# Patient Record
Sex: MALE | Race: WHITE | NOT HISPANIC OR LATINO | Employment: UNEMPLOYED | URBAN - METROPOLITAN AREA
[De-identification: names, ages, dates, MRNs, and addresses within clinical notes are randomized per-mention and may not be internally consistent; named-entity substitution may affect disease eponyms.]

---

## 2020-09-17 ENCOUNTER — RECORDS - HEALTHEAST (OUTPATIENT)
Dept: LAB | Facility: CLINIC | Age: 40
End: 2020-09-17

## 2020-09-17 LAB
ANION GAP SERPL CALCULATED.3IONS-SCNC: 16 MMOL/L (ref 5–18)
BUN SERPL-MCNC: 13 MG/DL (ref 8–22)
CALCIUM SERPL-MCNC: 9.7 MG/DL (ref 8.5–10.5)
CHLORIDE BLD-SCNC: 99 MMOL/L (ref 98–107)
CO2 SERPL-SCNC: 26 MMOL/L (ref 22–31)
CREAT SERPL-MCNC: 0.98 MG/DL (ref 0.7–1.3)
GFR SERPL CREATININE-BSD FRML MDRD: >60 ML/MIN/1.73M2
GLUCOSE BLD-MCNC: 85 MG/DL (ref 70–125)
POTASSIUM BLD-SCNC: 4.2 MMOL/L (ref 3.5–5)
SODIUM SERPL-SCNC: 141 MMOL/L (ref 136–145)
TSH SERPL DL<=0.005 MIU/L-ACNC: 1.26 UIU/ML (ref 0.3–5)

## 2021-02-06 ENCOUNTER — HOSPITAL ENCOUNTER (EMERGENCY)
Facility: CLINIC | Age: 41
Discharge: HOME | End: 2021-02-06
Attending: EMERGENCY MEDICINE | Admitting: EMERGENCY MEDICINE
Payer: COMMERCIAL

## 2021-02-06 VITALS
DIASTOLIC BLOOD PRESSURE: 87 MMHG | TEMPERATURE: 96.6 F | HEART RATE: 115 BPM | WEIGHT: 175 LBS | RESPIRATION RATE: 12 BRPM | SYSTOLIC BLOOD PRESSURE: 117 MMHG | OXYGEN SATURATION: 97 %

## 2021-02-06 DIAGNOSIS — F10.930 ALCOHOL WITHDRAWAL SYNDROME WITHOUT COMPLICATION (H): ICD-10-CM

## 2021-02-06 LAB
ALBUMIN SERPL-MCNC: 4.6 G/DL (ref 3.4–5)
ALCOHOL BREATH TEST: 0.19 (ref 0–0.01)
ALCOHOL BREATH TEST: 0.36 (ref 0–0.01)
ALP SERPL-CCNC: 69 U/L (ref 40–150)
ALT SERPL W P-5'-P-CCNC: 60 U/L (ref 0–70)
AMPHETAMINES UR QL SCN: NEGATIVE
ANION GAP SERPL CALCULATED.3IONS-SCNC: 9 MMOL/L (ref 3–14)
AST SERPL W P-5'-P-CCNC: 33 U/L (ref 0–45)
BARBITURATES UR QL: NEGATIVE
BASOPHILS # BLD AUTO: 0.1 10E9/L (ref 0–0.2)
BASOPHILS NFR BLD AUTO: 1 %
BENZODIAZ UR QL: NEGATIVE
BILIRUB SERPL-MCNC: 0.7 MG/DL (ref 0.2–1.3)
BUN SERPL-MCNC: 13 MG/DL (ref 7–30)
CALCIUM SERPL-MCNC: 8.6 MG/DL (ref 8.5–10.1)
CANNABINOIDS UR QL SCN: NEGATIVE
CHLORIDE SERPL-SCNC: 108 MMOL/L (ref 94–109)
CO2 SERPL-SCNC: 29 MMOL/L (ref 20–32)
COCAINE UR QL: NEGATIVE
CREAT SERPL-MCNC: 1.03 MG/DL (ref 0.66–1.25)
DIFFERENTIAL METHOD BLD: ABNORMAL
EOSINOPHIL # BLD AUTO: 0.2 10E9/L (ref 0–0.7)
EOSINOPHIL NFR BLD AUTO: 1.9 %
ERYTHROCYTE [DISTWIDTH] IN BLOOD BY AUTOMATED COUNT: 11.9 % (ref 10–15)
ETHANOL UR QL SCN: POSITIVE
GFR SERPL CREATININE-BSD FRML MDRD: >90 ML/MIN/{1.73_M2}
GLUCOSE SERPL-MCNC: 93 MG/DL (ref 70–99)
HCT VFR BLD AUTO: 51.1 % (ref 40–53)
HGB BLD-MCNC: 17.8 G/DL (ref 13.3–17.7)
IMM GRANULOCYTES # BLD: 0 10E9/L (ref 0–0.4)
IMM GRANULOCYTES NFR BLD: 0.3 %
LABORATORY COMMENT REPORT: NORMAL
LYMPHOCYTES # BLD AUTO: 4.9 10E9/L (ref 0.8–5.3)
LYMPHOCYTES NFR BLD AUTO: 42.4 %
MCH RBC QN AUTO: 31.9 PG (ref 26.5–33)
MCHC RBC AUTO-ENTMCNC: 34.8 G/DL (ref 31.5–36.5)
MCV RBC AUTO: 92 FL (ref 78–100)
MONOCYTES # BLD AUTO: 0.4 10E9/L (ref 0–1.3)
MONOCYTES NFR BLD AUTO: 3.8 %
NEUTROPHILS # BLD AUTO: 5.8 10E9/L (ref 1.6–8.3)
NEUTROPHILS NFR BLD AUTO: 50.6 %
NRBC # BLD AUTO: 0 10*3/UL
NRBC BLD AUTO-RTO: 0 /100
OPIATES UR QL SCN: NEGATIVE
PLATELET # BLD AUTO: 288 10E9/L (ref 150–450)
POTASSIUM SERPL-SCNC: 3.6 MMOL/L (ref 3.4–5.3)
PROT SERPL-MCNC: 7.8 G/DL (ref 6.8–8.8)
RBC # BLD AUTO: 5.58 10E12/L (ref 4.4–5.9)
SARS-COV-2 RNA RESP QL NAA+PROBE: NEGATIVE
SODIUM SERPL-SCNC: 146 MMOL/L (ref 133–144)
SPECIMEN SOURCE: NORMAL
WBC # BLD AUTO: 11.5 10E9/L (ref 4–11)

## 2021-02-06 PROCEDURE — 82075 ASSAY OF BREATH ETHANOL: CPT | Performed by: EMERGENCY MEDICINE

## 2021-02-06 PROCEDURE — 87635 SARS-COV-2 COVID-19 AMP PRB: CPT | Performed by: EMERGENCY MEDICINE

## 2021-02-06 PROCEDURE — 80320 DRUG SCREEN QUANTALCOHOLS: CPT | Performed by: EMERGENCY MEDICINE

## 2021-02-06 PROCEDURE — 250N000013 HC RX MED GY IP 250 OP 250 PS 637: Performed by: EMERGENCY MEDICINE

## 2021-02-06 PROCEDURE — 85025 COMPLETE CBC W/AUTO DIFF WBC: CPT | Performed by: EMERGENCY MEDICINE

## 2021-02-06 PROCEDURE — 99283 EMERGENCY DEPT VISIT LOW MDM: CPT | Performed by: EMERGENCY MEDICINE

## 2021-02-06 PROCEDURE — C9803 HOPD COVID-19 SPEC COLLECT: HCPCS | Performed by: EMERGENCY MEDICINE

## 2021-02-06 PROCEDURE — 80307 DRUG TEST PRSMV CHEM ANLYZR: CPT | Performed by: EMERGENCY MEDICINE

## 2021-02-06 PROCEDURE — 80053 COMPREHEN METABOLIC PANEL: CPT | Performed by: EMERGENCY MEDICINE

## 2021-02-06 RX ORDER — CITALOPRAM HYDROBROMIDE 10 MG/1
10 TABLET ORAL DAILY
COMMUNITY
End: 2021-02-06

## 2021-02-06 RX ORDER — ESCITALOPRAM OXALATE 10 MG/1
10 TABLET ORAL DAILY
COMMUNITY
End: 2021-12-08

## 2021-02-06 RX ORDER — METHOCARBAMOL 500 MG/1
500 TABLET, FILM COATED ORAL 4 TIMES DAILY PRN
COMMUNITY
End: 2024-07-22

## 2021-02-06 RX ORDER — LORAZEPAM 1 MG/1
1-4 TABLET ORAL EVERY 30 MIN PRN
Status: DISCONTINUED | OUTPATIENT
Start: 2021-02-06 | End: 2021-02-06 | Stop reason: HOSPADM

## 2021-02-06 RX ADMIN — NICOTINE POLACRILEX 4 MG: 4 GUM, CHEWING BUCCAL at 21:14

## 2021-02-06 RX ADMIN — NICOTINE POLACRILEX 4 MG: 4 GUM, CHEWING BUCCAL at 19:38

## 2021-02-06 RX ADMIN — NICOTINE POLACRILEX 2 MG: 2 GUM, CHEWING BUCCAL at 18:19

## 2021-02-06 RX ADMIN — NICOTINE POLACRILEX 4 MG: 4 GUM, CHEWING BUCCAL at 17:28

## 2021-02-06 RX ADMIN — LORAZEPAM 2 MG: 1 TABLET ORAL at 18:10

## 2021-02-06 ASSESSMENT — ENCOUNTER SYMPTOMS
FEVER: 0
COUGH: 0
CHILLS: 0
SHORTNESS OF BREATH: 0
NAUSEA: 0
VOMITING: 0

## 2021-02-06 NOTE — ED PROVIDER NOTES
ED Provider Note  [facility]      History     Chief Complaint   Patient presents with     Drug / Alcohol Assessment     The history is provided by the patient and medical records.   Drug / Alcohol Assessment  Pertinent negatives include no shortness of breath.     Cristobal Jorge is a 40 year old male with a history of depression, anxiety, and chronic alcohol abuse who  presents to the ED for evaluation of alcohol intoxication seeking detox. His last drink was prior to arrival.  He reports chronic alcohol use, drinking at least a liter per day for many months.  He denies nausea, vomiting, fevers, chills, cough, or shortness of breath.  Patient denies history of withdrawal seizures or DTs.  He denies SI or HI. Denies other substance use.    No past medical history on file.    No past surgical history on file.    No family history on file.    Social History     Tobacco Use     Smoking status: Not on file   Substance Use Topics     Alcohol use: Not on file       Current Facility-Administered Medications   Medication     LORazepam (ATIVAN) tablet 1-4 mg     No current outpatient medications on file.        Allergies   Allergen Reactions     Amoxicillin Rash and Hives        Review of Systems   Constitutional: Negative for chills and fever.   Respiratory: Negative for cough and shortness of breath.    Gastrointestinal: Negative for nausea and vomiting.   All other systems reviewed and are negative.    A complete review of systems was performed with pertinent positives and negatives noted in the HPI, and all other systems negative.    Physical Exam   BP: 138/78  Pulse: 79  Temp: 97.9  F (36.6  C)  SpO2: 97 %  Physical Exam  Vitals signs and nursing note reviewed.   Constitutional:       General: He is not in acute distress.     Appearance: He is not diaphoretic.   HENT:      Head: Atraumatic.   Eyes:      General: No scleral icterus.     Pupils: Pupils are equal, round, and reactive to  light.   Cardiovascular:      Heart sounds: Normal heart sounds.   Pulmonary:      Effort: No respiratory distress.      Breath sounds: Normal breath sounds.   Abdominal:      General: Bowel sounds are normal.      Palpations: Abdomen is soft.      Tenderness: There is no abdominal tenderness.   Musculoskeletal:         General: No tenderness.   Skin:     General: Skin is warm.      Findings: No rash.           ED Course      Procedures                            Results for orders placed or performed during the hospital encounter of 02/06/21   Alcohol breath test POCT     Status: Abnormal   Result Value Ref Range    Alcohol Breath Test 0.358 (A) 0.00 - 0.01     Medications   LORazepam (ATIVAN) tablet 1-4 mg (has no administration in time range)        Assessments & Plan (with Medical Decision Making)     40 year old male with a history of depression, anxiety, and chronic alcohol abuse who  presents to the ED for evaluation of alcohol intoxication seeking detox.  Patient placed on MSSA protocol and managed accordingly.  He requested and was given nicotine gum 4 mg several times during the course of the ED stay.  Screening labs were drawn sent reviewed and document in epic and remarkable for hemoglobin of 17.8 and a leukocytosis with a white count 11.5, both abnormalities likely representative of hemoconcentration.  Alcohol level 0.190, sodium slightly elevated 146, UDS positive for ethanol.  COVID-19 swab negative.  Case was discussed with behavioral intake in room on detox floor was a research.  However approximately 20 minutes prior to transfer to detox floor patient recanted his intent to follow through with detox and requested discharge home.    I have reviewed the nursing notes. I have reviewed the findings, diagnosis, plan and need for follow up with the patient.    New Prescriptions    No medications on file       Final diagnoses:   Alcohol withdrawal syndrome without complication (H)     Ana Luisa FRANCIS  Shaka, am serving as a trained medical scribe to document services personally performed by Chelsea Ritchie MD, based on the provider's statements to me.      I, Chelsea Ritchie MD, was physically present and have reviewed and verified the accuracy of this note documented by Ana Luisa Matt.     --  Chelsea Ritchie MD  [facility] EMERGENCY DEPARTMENT  2/6/2021     Chelsea Ritchie MD  02/08/21 1828

## 2021-02-07 NOTE — PHARMACY-ADMISSION MEDICATION HISTORY
Admission Medication History Completed by Pharmacy    See [facility] Admission Navigator for allergy information, preferred outpatient pharmacy, prior to admission medications and immunization status.     Medication History Sources:     Patient    Sure Scripts    Changes made to PTA medication list (reason):    Added:   o Escitalopram 10mg tablet    Deleted:   o Citalopram 10mg tablet: Take one tablet by mouth daily (per patient and Sure Scripts)    Changed: None    Additional Information:    Patient was a good historian of his medications and was able to confirm information found on Sure Scripts.    Prior to Admission medications    Medication Sig Last Dose Taking? Auth Provider   escitalopram (LEXAPRO) 10 MG tablet Take 10 mg by mouth daily 2/6/2021 Yes Unknown, Entered By History   methocarbamol (ROBAXIN) 500 MG tablet Take 500 mg by mouth 4 times daily as needed for muscle spasms Past Week Yes Reported, Patient       Date completed: 02/06/21    Medication history completed by: Adeline Baxter

## 2021-02-07 NOTE — ED NOTES
"Writer was called to patient room as patient was frustrated with the process. Patients mother was on the phone yelling at writer about \"how I do not care for people who need help\". Writer explained that the process is bigger than my control. The patient was explained that he could find a sober ride and go home or stay and wait out the process. Patient asked to call him wife and to have writer come back. After returning patient was calmer and stated that he was going to stay. He asked if he could gt his vape and go for a walk, writer replied no and offered a piece of gum.  "

## 2021-02-07 NOTE — PROGRESS NOTES
ED to Behavioral Floor Handoff    SITUATION  Cristobal Jorge is a 40 year old male who speaks English and lives in a home alone The patient arrived in the ED by private car from emergency room with a complaint of Drug / Alcohol Assessment  .The patient's current symptoms started/worsened 1 week(s) ago and during this time the symptoms have increased.   In the ED, pt was diagnosed with   Final diagnoses:   Alcohol withdrawal syndrome without complication (H)        Initial vitals were: BP: 138/78  Pulse: 79  Temp: 97.9  F (36.6  C)  Resp: 12  Weight: 79.4 kg (175 lb)  SpO2: 97 %   --------  Is the patient diabetic? No   If yes, last blood glucose? --     If yes, was this treated in the ED? --  --------  Is the patient inebriated (ETOH) Yes or Impaired on other substances? Yes  MSSA done? Yes  Last MSSA score:13 Were withdrawal symptoms treated? Yes  Does the patient have a seizure history? No. If yes, date of most recent seizure--  --------  Is the patient patient experiencing suicidal ideation? denies current or recent suicidal ideation     Homicidal ideation? denies current or recent homicidal ideation or behaviors.    Self-injurious behavior/urges? denies current or recent self injurious behavior or ideation.  ------  Was pt aggressive in the ED No  Was a code called No  Is the pt now cooperative? Yes  -------  Meds given in ED:   Medications   LORazepam (ATIVAN) tablet 1-4 mg (2 mg Oral Given 2/6/21 1810)   nicotine polacrilex (NICORETTE) gum 4 mg (4 mg Buccal Given 2/6/21 1728)   nicotine (NICORETTE) gum 2 mg (2 mg Buccal Given 2/6/21 1819)      Family present during ED course? No  Family currently present? No    BACKGROUND  Does the patient have a cognitive impairment or developmental disability? No  Allergies:   Allergies   Allergen Reactions     Amoxicillin Rash and Hives   .   Social demographics are   Social History     Socioeconomic History     Marital status:      Spouse name: Not on file      Number of children: Not on file     Years of education: Not on file     Highest education level: Not on file   Occupational History     Not on file   Social Needs     Financial resource strain: Not on file     Food insecurity     Worry: Not on file     Inability: Not on file     Transportation needs     Medical: Not on file     Non-medical: Not on file   Tobacco Use     Smoking status: Not on file   Substance and Sexual Activity     Alcohol use: Not on file     Drug use: Not on file     Sexual activity: Not on file   Lifestyle     Physical activity     Days per week: Not on file     Minutes per session: Not on file     Stress: Not on file   Relationships     Social connections     Talks on phone: Not on file     Gets together: Not on file     Attends Synagogue service: Not on file     Active member of club or organization: Not on file     Attends meetings of clubs or organizations: Not on file     Relationship status: Not on file     Intimate partner violence     Fear of current or ex partner: Not on file     Emotionally abused: Not on file     Physically abused: Not on file     Forced sexual activity: Not on file   Other Topics Concern     Not on file   Social History Narrative     Not on file        ASSESSMENT  Labs results   Labs Ordered and Resulted from Time of ED Arrival Up to the Time of Departure from the ED   CBC WITH PLATELETS DIFFERENTIAL - Abnormal; Notable for the following components:       Result Value    WBC 11.5 (*)     Hemoglobin 17.8 (*)     All other components within normal limits   COMPREHENSIVE METABOLIC PANEL - Abnormal; Notable for the following components:    Sodium 146 (*)     All other components within normal limits   ALCOHOL BREATH TEST POCT - Abnormal; Notable for the following components:    Alcohol Breath Test 0.358 (*)     All other components within normal limits   SARS-COV-2 (COVID-19) VIRUS RT-PCR   DRUG ABUSE SCREEN 6 CHEM DEP URINE ([facility])   MSSA SCORE AND VS   NOTIFY       Imaging Studies: No results found for this or any previous visit (from the past 24 hour(s)).   Most recent vital signs /78   Pulse 86   Temp 97.9  F (36.6  C) (Tympanic)   Resp 12   Wt 79.4 kg (175 lb)   SpO2 97%    Abnormal labs/tests/findings requiring intervention:---   Pain control: good  Nausea control: good    RECOMMENDATION  Are any infection precautions needed (MRSA, VRE, etc.)? No If yes, what infection? --  ---  Does the patient have mobility issues? independently. If yes, what device does the pt use? ---  ---  Is patient on 72 hour hold or commitment? No If on 72 hour hold, have hold and rights been given to patient? No  Are admitting orders written if after 10 p.m. ?No  Tasks needing to be completed:---     Ayden Kingsley, RN     [phone] [facility]

## 2021-02-07 NOTE — ED NOTES
Writer was called to the room after noting that patient had a bed on 3a and called report. Writer was told that patient could go up in 20mins. Writer went to speak with patient and patient was adement to go home. MD Ritchie spoke with patient and again, patient was adement that he wanted to go home. Wife was coming to  patient. Discharged was prepared for the patient

## 2021-04-03 ENCOUNTER — HEALTH MAINTENANCE LETTER (OUTPATIENT)
Age: 41
End: 2021-04-03

## 2021-09-18 ENCOUNTER — HEALTH MAINTENANCE LETTER (OUTPATIENT)
Age: 41
End: 2021-09-18

## 2021-12-08 ENCOUNTER — HOSPITAL ENCOUNTER (INPATIENT)
Facility: CLINIC | Age: 41
LOS: 1 days | Discharge: HOME | DRG: 897 | End: 2021-12-10
Attending: EMERGENCY MEDICINE | Admitting: PSYCHIATRY & NEUROLOGY
Payer: COMMERCIAL

## 2021-12-08 ENCOUNTER — TELEPHONE (OUTPATIENT)
Dept: BEHAVIORAL HEALTH | Facility: CLINIC | Age: 41
End: 2021-12-08

## 2021-12-08 DIAGNOSIS — F10.20 ALCOHOL USE DISORDER, MODERATE, DEPENDENCE (H): Primary | ICD-10-CM

## 2021-12-08 DIAGNOSIS — Z20.822 CONTACT WITH AND (SUSPECTED) EXPOSURE TO COVID-19: ICD-10-CM

## 2021-12-08 LAB
ALBUMIN SERPL-MCNC: 5 G/DL (ref 3.4–5)
ALCOHOL BREATH TEST: 0.31 (ref 0–0.01)
ALP SERPL-CCNC: 65 U/L (ref 40–150)
ALT SERPL W P-5'-P-CCNC: 40 U/L (ref 0–70)
AMPHETAMINES UR QL SCN: NORMAL
ANION GAP SERPL CALCULATED.3IONS-SCNC: 12 MMOL/L (ref 3–14)
AST SERPL W P-5'-P-CCNC: 32 U/L (ref 0–45)
BARBITURATES UR QL: NORMAL
BASOPHILS # BLD AUTO: 0.1 10E3/UL (ref 0–0.2)
BASOPHILS NFR BLD AUTO: 1 %
BENZODIAZ UR QL: NORMAL
BILIRUB SERPL-MCNC: 0.9 MG/DL (ref 0.2–1.3)
BUN SERPL-MCNC: 21 MG/DL (ref 7–30)
CALCIUM SERPL-MCNC: 9.5 MG/DL (ref 8.5–10.1)
CANNABINOIDS UR QL SCN: NORMAL
CHLORIDE BLD-SCNC: 102 MMOL/L (ref 94–109)
CO2 SERPL-SCNC: 26 MMOL/L (ref 20–32)
COCAINE UR QL: NORMAL
CREAT SERPL-MCNC: 0.89 MG/DL (ref 0.66–1.25)
EOSINOPHIL # BLD AUTO: 0.2 10E3/UL (ref 0–0.7)
EOSINOPHIL NFR BLD AUTO: 1 %
ERYTHROCYTE [DISTWIDTH] IN BLOOD BY AUTOMATED COUNT: 11.3 % (ref 10–15)
GFR SERPL CREATININE-BSD FRML MDRD: >90 ML/MIN/1.73M2
GLUCOSE BLD-MCNC: 97 MG/DL (ref 70–99)
HCT VFR BLD AUTO: 50.7 % (ref 40–53)
HGB BLD-MCNC: 18.6 G/DL (ref 13.3–17.7)
HOLD SPECIMEN: NORMAL
IMM GRANULOCYTES # BLD: 0.1 10E3/UL
IMM GRANULOCYTES NFR BLD: 0 %
LIPASE SERPL-CCNC: 135 U/L (ref 73–393)
LYMPHOCYTES # BLD AUTO: 5.2 10E3/UL (ref 0.8–5.3)
LYMPHOCYTES NFR BLD AUTO: 34 %
MCH RBC QN AUTO: 30.8 PG (ref 26.5–33)
MCHC RBC AUTO-ENTMCNC: 36.7 G/DL (ref 31.5–36.5)
MCV RBC AUTO: 84 FL (ref 78–100)
MONOCYTES # BLD AUTO: 0.7 10E3/UL (ref 0–1.3)
MONOCYTES NFR BLD AUTO: 4 %
NEUTROPHILS # BLD AUTO: 9 10E3/UL (ref 1.6–8.3)
NEUTROPHILS NFR BLD AUTO: 60 %
NRBC # BLD AUTO: 0 10E3/UL
NRBC BLD AUTO-RTO: 0 /100
OPIATES UR QL SCN: NORMAL
PLAT MORPH BLD: NORMAL
PLATELET # BLD AUTO: 304 10E3/UL (ref 150–450)
POTASSIUM BLD-SCNC: 3.9 MMOL/L (ref 3.4–5.3)
PROT SERPL-MCNC: 8.6 G/DL (ref 6.8–8.8)
RBC # BLD AUTO: 6.04 10E6/UL (ref 4.4–5.9)
RBC MORPH BLD: NORMAL
SARS-COV-2 RNA RESP QL NAA+PROBE: NEGATIVE
SODIUM SERPL-SCNC: 140 MMOL/L (ref 133–144)
WBC # BLD AUTO: 15.2 10E3/UL (ref 4–11)

## 2021-12-08 PROCEDURE — 82040 ASSAY OF SERUM ALBUMIN: CPT | Performed by: EMERGENCY MEDICINE

## 2021-12-08 PROCEDURE — 36415 COLL VENOUS BLD VENIPUNCTURE: CPT | Performed by: EMERGENCY MEDICINE

## 2021-12-08 PROCEDURE — C9803 HOPD COVID-19 SPEC COLLECT: HCPCS | Performed by: EMERGENCY MEDICINE

## 2021-12-08 PROCEDURE — 250N000013 HC RX MED GY IP 250 OP 250 PS 637: Performed by: EMERGENCY MEDICINE

## 2021-12-08 PROCEDURE — 82075 ASSAY OF BREATH ETHANOL: CPT | Performed by: EMERGENCY MEDICINE

## 2021-12-08 PROCEDURE — 83690 ASSAY OF LIPASE: CPT | Performed by: EMERGENCY MEDICINE

## 2021-12-08 PROCEDURE — 250N000011 HC RX IP 250 OP 636: Performed by: EMERGENCY MEDICINE

## 2021-12-08 PROCEDURE — 99284 EMERGENCY DEPT VISIT MOD MDM: CPT | Performed by: EMERGENCY MEDICINE

## 2021-12-08 PROCEDURE — U0003 INFECTIOUS AGENT DETECTION BY NUCLEIC ACID (DNA OR RNA); SEVERE ACUTE RESPIRATORY SYNDROME CORONAVIRUS 2 (SARS-COV-2) (CORONAVIRUS DISEASE [COVID-19]), AMPLIFIED PROBE TECHNIQUE, MAKING USE OF HIGH THROUGHPUT TECHNOLOGIES AS DESCRIBED BY CMS-2020-01-R: HCPCS | Performed by: EMERGENCY MEDICINE

## 2021-12-08 PROCEDURE — 85025 COMPLETE CBC W/AUTO DIFF WBC: CPT | Performed by: EMERGENCY MEDICINE

## 2021-12-08 PROCEDURE — 80307 DRUG TEST PRSMV CHEM ANLYZR: CPT | Performed by: EMERGENCY MEDICINE

## 2021-12-08 PROCEDURE — 99285 EMERGENCY DEPT VISIT HI MDM: CPT | Performed by: EMERGENCY MEDICINE

## 2021-12-08 RX ORDER — DIAZEPAM 5 MG
5-20 TABLET ORAL EVERY 30 MIN PRN
Status: DISCONTINUED | OUTPATIENT
Start: 2021-12-08 | End: 2021-12-09

## 2021-12-08 RX ORDER — MULTIPLE VITAMINS W/ MINERALS TAB 9MG-400MCG
1 TAB ORAL DAILY
COMMUNITY

## 2021-12-08 RX ORDER — ONDANSETRON 4 MG/1
4 TABLET, ORALLY DISINTEGRATING ORAL ONCE
Status: COMPLETED | OUTPATIENT
Start: 2021-12-08 | End: 2021-12-08

## 2021-12-08 RX ORDER — FOLIC ACID 1 MG/1
1 TABLET ORAL DAILY
Status: DISCONTINUED | OUTPATIENT
Start: 2021-12-08 | End: 2021-12-09

## 2021-12-08 RX ORDER — MULTIPLE VITAMINS W/ MINERALS TAB 9MG-400MCG
1 TAB ORAL DAILY
Status: DISCONTINUED | OUTPATIENT
Start: 2021-12-08 | End: 2021-12-09

## 2021-12-08 RX ORDER — NICOTINE 21 MG/24HR
1 PATCH, TRANSDERMAL 24 HOURS TRANSDERMAL ONCE
Status: DISCONTINUED | OUTPATIENT
Start: 2021-12-08 | End: 2021-12-09

## 2021-12-08 RX ADMIN — THIAMINE HCL TAB 100 MG 100 MG: 100 TAB at 20:20

## 2021-12-08 RX ADMIN — ONDANSETRON 4 MG: 4 TABLET, ORALLY DISINTEGRATING ORAL at 19:43

## 2021-12-08 RX ADMIN — NICOTINE 1 PATCH: 21 PATCH, EXTENDED RELEASE TRANSDERMAL at 17:57

## 2021-12-08 RX ADMIN — DIAZEPAM 10 MG: 5 TABLET ORAL at 22:56

## 2021-12-08 RX ADMIN — FOLIC ACID 1 MG: 1 TABLET ORAL at 20:19

## 2021-12-08 RX ADMIN — DIAZEPAM 10 MG: 5 TABLET ORAL at 20:15

## 2021-12-08 RX ADMIN — DIAZEPAM 10 MG: 5 TABLET ORAL at 21:19

## 2021-12-08 RX ADMIN — DIAZEPAM 5 MG: 5 TABLET ORAL at 17:56

## 2021-12-08 RX ADMIN — MULTIPLE VITAMINS W/ MINERALS TAB 1 TABLET: TAB at 20:20

## 2021-12-08 NOTE — PHARMACY-ADMISSION MEDICATION HISTORY
Admission Medication History Completed by Pharmacy    See [facility] Admission Navigator for allergy information, preferred outpatient pharmacy, prior to admission medications and immunization status.     Medication History Sources:     Patient    Sure Scripts    Changes made to PTA medication list (reason):    Added: multivitamin (per pt)    Deleted: Lexapro 10mg daily (per pt not taking, last filled April 2021)    Changed: None    Additional Information:    Patient reports only taking a multivitamin each day. He says he still has some of the PRN methocarbamol that was last filled January 2021.    Prior to Admission medications    Medication Sig Last Dose Taking? Auth Provider   methocarbamol (ROBAXIN) 500 MG tablet Take 500 mg by mouth 4 times daily as needed for muscle spasms Past Month at Unknown time Yes Reported, Patient   multivitamin w/minerals (THERA-VIT-M) tablet Take 1 tablet by mouth daily 12/7/2021 at Unknown time Yes Unknown, Entered By History     Date completed: 12/08/21    Medication history completed by: Lora Slaughter, PharmD, BCPS

## 2021-12-08 NOTE — ED PROVIDER NOTES
[facility] EMERGENCY DEPARTMENT ([facility])    12/08/21     [facility] 2 2:41 PM   History     Chief Complaint   Patient presents with     Drug / Alcohol Assessment     HPI  Cristobal Jorge is a 41 year old male who presents seeking alcohol detox. He was brought here by a friend. He states that 2 months ago he went to [facility] for recovery. He started drinking again last Wednesday. He has been drinking 1 pint of vodka a day. He denies any episodes of nausea, vomiting or falls. 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 No history of depression or anxiety. He is prescribed Lexapro a year ago but was told he wouldn't need this, is not currently on this medication. He last took it 1 year ago. No history of alcohol withdrawal seizures or DTs. He can't tell if he is experiencing withdrawal yet or not, states he is shaky at baseline. No COVID-19 exposure. No COVID-19 symptoms, no cough or fever. No abdominal pain. He feels nauseated but no vomiting. No history of pancreatitis or alcoholic liver disease. He denies any other substance use. He denies any medical concerns. Per [facility] patient has had 3 doses of COVID-19 vaccination, last dose given on 11/18/21.     Past Medical History  Past Medical History:   Diagnosis Date     Substance abuse (H)      No past surgical history on file.  folic acid (FOLVITE) 1 MG tablet  methocarbamol (ROBAXIN) 500 MG tablet  multivitamin w/minerals (THERA-VIT-M) tablet  thiamine (B-1) 100 MG tablet      Allergies   Allergen Reactions     Amoxicillin Rash and Hives     Family History  No family history on file.  Social History   Social History     Tobacco Use     Smoking status: Not on file     Smokeless tobacco: Not on file   Substance Use Topics     Alcohol use: Not on file     Drug use: Not on file      Past medical history, past surgical history, medications, allergies, family history, and social history were reviewed with the  patient. No additional pertinent items.       Review of Systems  A complete review of systems was performed with pertinent positives and negatives noted in the HPI, and all other systems negative.    Physical Exam   BP: 135/86  Pulse: (!) 122  Temp: 97.7  F (36.5  C)  Resp: 16  Height: 182.9 cm (6')  Weight: 75 kg (165 lb 6.4 oz)  SpO2: 97 %     Physical Exam  Gen:A&Ox3, intoxicated but cooperative, tearful  HEENT: head atraumatic, PERRL  CV:RRR without murmurs  PULM:Clear to auscultation bilaterally  Abd:soft, nontender, nondistended. Negative rosales's sign. Bowel sounds present and normal  UE:No traumatic injuries, skin normal  LE:no traumatic injuries, skin normal, no LE   Neuro:CN II-XII intact, strength 5/5 throughout  Skin: no rashes or ecchymoses     ED Course      Procedures       Results for orders placed or performed during the hospital encounter of 12/08/21   Comprehensive metabolic panel     Status: Normal   Result Value Ref Range    Sodium 140 133 - 144 mmol/L    Potassium 3.9 3.4 - 5.3 mmol/L    Chloride 102 94 - 109 mmol/L    Carbon Dioxide (CO2) 26 20 - 32 mmol/L    Anion Gap 12 3 - 14 mmol/L    Urea Nitrogen 21 7 - 30 mg/dL    Creatinine 0.89 0.66 - 1.25 mg/dL    Calcium 9.5 8.5 - 10.1 mg/dL    Glucose 97 70 - 99 mg/dL    Alkaline Phosphatase 65 40 - 150 U/L    AST 32 0 - 45 U/L    ALT 40 0 - 70 U/L    Protein Total 8.6 6.8 - 8.8 g/dL    Albumin 5.0 3.4 - 5.0 g/dL    Bilirubin Total 0.9 0.2 - 1.3 mg/dL    GFR Estimate >90 >60 mL/min/1.73m2   Lipase     Status: Normal   Result Value Ref Range    Lipase 135 73 - 393 U/L   Asymptomatic COVID-19 Virus (Coronavirus) by PCR Nasopharyngeal     Status: Normal    Specimen: Nasopharyngeal; Swab   Result Value Ref Range    SARS CoV2 PCR Negative Negative, Testing sent to reference lab. Results will be returned via unsolicited result    Narrative    Testing was performed using the [employer] Xpress SARS-CoV-2 Assay on the  Cepheid Gene-Xpert Instrument Systems.  Additional information about  this Emergency Use Authorization (EUA) assay can be found via the Lab  Guide. This test should be ordered for the detection of SARS-CoV-2 in  individuals who meet SARS-CoV-2 clinical and/or epidemiological  criteria. Test performance is unknown in asymptomatic patients. This  test is for in vitro diagnostic use under the FDA EUA for  laboratories certified under CLIA to perform high complexity testing.  This test has not been FDA cleared or approved. A negative result  does not rule out the presence of PCR inhibitors in the specimen or  target RNA in concentration below the limit of detection for the  assay. The possibility of a false negative should be considered if  the patient's recent exposure or clinical presentation suggests  COVID-19. This test was validated by the [facility]  [facility]. This laboratory is certified under  the Clinical Laboratory Improvement Amendments of 1988 (CLIA-88) as  qualified to perform high complexity laboratory testing.     CBC with platelets and differential     Status: Abnormal   Result Value Ref Range    WBC Count 15.2 (H) 4.0 - 11.0 10e3/uL    RBC Count 6.04 (H) 4.40 - 5.90 10e6/uL    Hemoglobin 18.6 (H) 13.3 - 17.7 g/dL    Hematocrit 50.7 40.0 - 53.0 %    MCV 84 78 - 100 fL    MCH 30.8 26.5 - 33.0 pg    MCHC 36.7 (H) 31.5 - 36.5 g/dL    RDW 11.3 10.0 - 15.0 %    Platelet Count 304 150 - 450 10e3/uL    % Neutrophils 60 %    % Lymphocytes 34 %    % Monocytes 4 %    % Eosinophils 1 %    % Basophils 1 %    % Immature Granulocytes 0 %    NRBCs per 100 WBC 0 <1 /100    Absolute Neutrophils 9.0 (H) 1.6 - 8.3 10e3/uL    Absolute Lymphocytes 5.2 0.8 - 5.3 10e3/uL    Absolute Monocytes 0.7 0.0 - 1.3 10e3/uL    Absolute Eosinophils 0.2 0.0 - 0.7 10e3/uL    Absolute Basophils 0.1 0.0 - 0.2 10e3/uL    Absolute Immature Granulocytes 0.1 <=0.4 10e3/uL    Absolute NRBCs 0.0 10e3/uL   Drug abuse screen 1 urine (ED)     Status:  Normal   Result Value Ref Range    Amphetamines Urine Screen Negative Screen Negative    Barbiturates Urine Screen Negative Screen Negative    Benzodiazepines Urine Screen Negative Screen Negative    Cannabinoids Urine Screen Negative Screen Negative    Cocaine Urine Screen Negative Screen Negative    Opiates Urine Screen Negative Screen Negative   Extra Red Top Tube     Status: None   Result Value Ref Range    Hold Specimen JIC    RBC and Platelet Morphology     Status: None   Result Value Ref Range    Platelet Assessment  Automated Count Confirmed. Platelet morphology is normal.     Automated Count Confirmed. Platelet morphology is normal.    RBC Morphology Confirmed RBC Indices    GGT     Status: Normal   Result Value Ref Range    GGT 47 0 - 75 U/L   TSH with free T4 reflex and/or T3 as indicated     Status: Normal   Result Value Ref Range    TSH 1.66 0.40 - 4.00 mU/L   Lipid panel     Status: Abnormal   Result Value Ref Range    Cholesterol 210 (H) <200 mg/dL    Triglycerides 228 (H) <150 mg/dL    Direct Measure HDL 64 >=40 mg/dL    LDL Cholesterol Calculated 100 <=100 mg/dL    Non HDL Cholesterol 146 (H) <130 mg/dL    Narrative    Cholesterol  Desirable:  <200 mg/dL    Triglycerides  Normal:  Less than 150 mg/dL  Borderline High:  150-199 mg/dL  High:  200-499 mg/dL  Very High:  Greater than or equal to 500 mg/dL    Direct Measure HDL  Female:  Greater than or equal to 50 mg/dL   Male:  Greater than or equal to 40 mg/dL    LDL Cholesterol  Desirable:  <100mg/dL  Above Desirable:  100-129 mg/dL   Borderline High:  130-159 mg/dL   High:  160-189 mg/dL   Very High:  >= 190 mg/dL    Non HDL Cholesterol  Desirable:  130 mg/dL  Above Desirable:  130-159 mg/dL  Borderline High:  160-189 mg/dL  High:  190-219 mg/dL  Very High:  Greater than or equal to 220 mg/dL   Internal Medicine Adult IP Consult for [facility] Detox on 3A: Patient to be seen: Routine within 24 hrs; Call back #: [id number]; alcoholism; Consultant may enter  "orders: Yes; Requesting provider? Hospitalist (if different from attending physician)     Status: None ()    Vicky Gay PA-C     12/9/2021 11:11 AM    [facility]  Internal Medicine Consult     Cristobal Jorge MRN# [id number]   Age: 41 year old YOB: 1980     Date of Admission: 12/8/2021  Date of Consult:  12/9/2021    Primary Care Provider: No Ref-Primary, Physician    Requesting Service: Psychiatry  Reason for Consult: General Medical Evaluation         Assessment and Recommendations:   Cristobal Jorge is a 41 year old male with a history of   alcohol abuse who was admitted to [facility] station 3 a seeking detox   from alcohol    #Alcohol abuse, dependence, acute withdrawal.  Management per   primary team, psychiatry.  Labs significant for mild elevation in   WBC (15.2) and hemoglobin (18.6), could represent   hemoconcentration in the setting of poor p.o. intake.  No fevers   or systemic symptoms to suggest infection. VSS.  -Agree with MSSA using Valium  -Agree with thiamine, folic acid, MVI  -Would repeat CBC if fever or other signs/symptoms of infection   develop      Internal Medicine will sign off at this time. Please notify if   any intercurrent medical questions or concerns arise. Thank you   for involving me in this patients care.         Vicky French PA-C  Hospitalist Service  [phone]           Chief Complaint:   \"Alcohol\"         History of Present Illness:   Cristobal Jorge is a 41 year old male with a history of   alcohol abuse who was admitted to [facility] station 3 a seeking detox   from alcohol.  Pt states that he recently was in a 30 day treatment program.   When he discharged he maintained sobriety for an additional 30   days, but then slipped back into daily drinking again. He   endorses recent use of 1 pint of vodka daily. He denies any other   drug use. He states that he is anxious to get through withdrawal   so that he can " "discharge. He has sobriety support in place.   He currently denies any symptoms of acute withdrawal and is   surprised that he feels \"pretty good\". He specifically denies any   chest pain, shortness of breath or difficulty breathing. He is   not having any fevers, abdominal pain, nausea or vomiting. He is   eating and drinking without issue.          Review of Systems:   A 10 point review of systems was performed and is negative unless   otherwise noted in HPI.          Past Medical History:     Past Medical History:   Diagnosis Date     Substance abuse (H)             Past Surgical History:    No past surgical history on file.          Family History:   No family history on file.          Social History:     Social History     Tobacco Use     Smoking status: Not on file     Smokeless tobacco: Not on file   Substance Use Topics     Alcohol use: Not on file             Medications:     Current Facility-Administered Medications   Medication     acetaminophen (TYLENOL) tablet 650 mg     alum & mag hydroxide-simethicone (MAALOX) suspension 30 mL     atenolol (TENORMIN) tablet 50 mg     diazepam (VALIUM) tablet 5-20 mg     folic acid (FOLVITE) tablet 1 mg     multivitamin w/minerals (THERA-VIT-M) tablet 1 tablet     nicotine (NICODERM CQ) 21 MG/24HR 24 hr patch 1 patch     nicotine (NICORETTE) gum 2 mg     nicotine Patch in Place     ondansetron (ZOFRAN-ODT) ODT tab 4 mg     senna-docusate (SENOKOT-S/PERICOLACE) 8.6-50 MG per tablet 1   tablet     thiamine (B-1) tablet 100 mg     traZODone (DESYREL) tablet 50 mg            Allergies:     Allergies   Allergen Reactions     Amoxicillin Rash and Hives             Physical Exam:   /83   Pulse 69   Temp 97.9  F (36.6  C) (Temporal)     Resp 16   Ht 1.829 m (6')   Wt 77.1 kg (170 lb)   SpO2 99%     BMI 23.06 kg/m     GENERAL: Alert and oriented x 3. NAD.   HEENT: Anicteric sclera. Mucous membranes moist.   CV: RRR. S1, S2. No murmurs appreciated.   RESPIRATORY: " Effort normal on room air. Lungs CTAB with no   wheezing, rales, rhonchi.   GI: Abdomen soft and non distended with normoactive bowel sounds   present in all quadrants. No tenderness, rebound, guarding.   MUSCULOSKELETAL: No joint swelling or tenderness. Moves all   extremities.   NEUROLOGICAL: No focal deficits. Strength 5/5 bilaterally in   upper and lower extremities.   EXTREMITIES: No peripheral edema. Intact bilateral pedal pulses.   SKIN: No jaundice. No rashes.          Labs:   CBC:  Recent Labs   Lab Test 12/08/21  1803   WBC 15.2*   RBC 6.04*   HGB 18.6*   HCT 50.7   MCV 84   MCH 30.8   MCHC 36.7*   RDW 11.3          CMP:  Recent Labs   Lab Test 12/08/21  1803      POTASSIUM 3.9   CHLORIDE 102   MARIAH 9.5   CO2 26   BUN 21   CR 0.89   GLC 97   AST 32   ALT 40   BILITOTAL 0.9   ALBUMIN 5.0   PROTTOTAL 8.6   ALKPHOS 65               Vicky French PA-C  Internal Medicine [facility] Hospitalist   [facility]   Pager: [phone]            Alcohol breath test POCT     Status: Abnormal   Result Value Ref Range    Alcohol Breath Test 0.314 (A) 0.00 - 0.01   CBC with platelets differential     Status: Abnormal    Narrative    The following orders were created for panel order CBC with platelets differential.  Procedure                               Abnormality         Status                     ---------                               -----------         ------                     CBC with platelets and d...[[id number]]  Abnormal            Final result               RBC and Platelet Morphology[[id number]]                      Final result                 Please view results for these tests on the individual orders.   Urine Drugs of Abuse Screen     Status: Normal    Narrative    The following orders were created for panel order Urine Drugs of Abuse Screen.  Procedure                               Abnormality         Status                     ---------                                -----------         ------                     Drug abuse screen 1 urin...[[id number]]  Normal              Final result                 Please view results for these tests on the individual orders.   [place] Draw     Status: None    Narrative    The following orders were created for panel order [place] Draw.  Procedure                               Abnormality         Status                     ---------                               -----------         ------                     Extra Red Top Tube[[id number]]                               Final result                 Please view results for these tests on the individual orders.     Medications   ondansetron (ZOFRAN-ODT) ODT tab 4 mg (4 mg Oral Given 12/8/21 1943)        Assessments & Plan (with Medical Decision Making)   42 yo M with a hx of alcoholism presenting with relapse on alcohol use for 1 week.   Vitals stable.   Pt requesting detox admission.   Does not have acute mental health issues or recent traumatic/acute medical issues that would preclude safe admission to the detox unit.   CBC, CMP, lipase and COVID-19 test ordered.   Discussed with detox unit for admission.     I have reviewed the nursing notes. I have reviewed the findings, diagnosis, plan and need for follow up with the patient.        Final diagnoses:   Alcohol use disorder, moderate, dependence (H)     I, Rosy Chao, am serving as a trained medical scribe to document services personally performed by Aranza Desai MD based on the provider's statements to me on December 8, 2021.  This document has been checked and approved by the attending provider.    IAranza MD, was physically present and have reviewed and verified the accuracy of this note documented by Rosy Chao, medical scribe.      Aranza Desai MD [facility]  12/8/2021     Aranza Desai MD  12/11/21 0114

## 2021-12-08 NOTE — ED NOTES
Patient requesting to leave.  MD informed and stated patient too intoxicated to discharge by self and would need a sober ride.  Patient placed on DEREK and given copy.  Patient informed of need for sober ride in order to discharge.  Holding current orders for blood draw and meds as patient declining detox.

## 2021-12-08 NOTE — ED TRIAGE NOTES
Seeking detox from alcohol.  Last drink 12 hours ago.  Was sober until 8 days ago.  No history of seizures.

## 2021-12-09 PROBLEM — F10.10 ALCOHOL ABUSE: Status: ACTIVE | Noted: 2021-12-09

## 2021-12-09 PROCEDURE — 250N000013 HC RX MED GY IP 250 OP 250 PS 637: Performed by: PSYCHIATRY & NEUROLOGY

## 2021-12-09 PROCEDURE — 128N000004 HC R&B CD ADULT

## 2021-12-09 PROCEDURE — 99223 1ST HOSP IP/OBS HIGH 75: CPT | Mod: AI | Performed by: PSYCHIATRY & NEUROLOGY

## 2021-12-09 PROCEDURE — 99207 PR CDG-MDM COMPONENT: MEETS HIGH - UP CODED: CPT | Performed by: PSYCHIATRY & NEUROLOGY

## 2021-12-09 PROCEDURE — 250N000013 HC RX MED GY IP 250 OP 250 PS 637: Performed by: EMERGENCY MEDICINE

## 2021-12-09 PROCEDURE — 99207 PR CONSULT E&M CHANGED TO SUBSEQUENT LEVEL: CPT | Performed by: PHYSICIAN ASSISTANT

## 2021-12-09 PROCEDURE — 99231 SBSQ HOSP IP/OBS SF/LOW 25: CPT | Performed by: PHYSICIAN ASSISTANT

## 2021-12-09 PROCEDURE — 250N000011 HC RX IP 250 OP 636: Performed by: PSYCHIATRY & NEUROLOGY

## 2021-12-09 PROCEDURE — HZ2ZZZZ DETOXIFICATION SERVICES FOR SUBSTANCE ABUSE TREATMENT: ICD-10-PCS | Performed by: PSYCHIATRY & NEUROLOGY

## 2021-12-09 RX ORDER — ATENOLOL 50 MG/1
50 TABLET ORAL DAILY PRN
Status: DISCONTINUED | OUTPATIENT
Start: 2021-12-09 | End: 2021-12-10 | Stop reason: HOSPADM

## 2021-12-09 RX ORDER — DIAZEPAM 5 MG
5-20 TABLET ORAL EVERY 30 MIN PRN
Status: DISCONTINUED | OUTPATIENT
Start: 2021-12-09 | End: 2021-12-10 | Stop reason: HOSPADM

## 2021-12-09 RX ORDER — TRAZODONE HYDROCHLORIDE 50 MG/1
50 TABLET, FILM COATED ORAL
Status: DISCONTINUED | OUTPATIENT
Start: 2021-12-09 | End: 2021-12-10 | Stop reason: HOSPADM

## 2021-12-09 RX ORDER — MAGNESIUM HYDROXIDE/ALUMINUM HYDROXICE/SIMETHICONE 120; 1200; 1200 MG/30ML; MG/30ML; MG/30ML
30 SUSPENSION ORAL EVERY 4 HOURS PRN
Status: DISCONTINUED | OUTPATIENT
Start: 2021-12-09 | End: 2021-12-10 | Stop reason: HOSPADM

## 2021-12-09 RX ORDER — LANOLIN ALCOHOL/MO/W.PET/CERES
9 CREAM (GRAM) TOPICAL AT BEDTIME
Status: DISCONTINUED | OUTPATIENT
Start: 2021-12-09 | End: 2021-12-10 | Stop reason: HOSPADM

## 2021-12-09 RX ORDER — ONDANSETRON 4 MG/1
4 TABLET, ORALLY DISINTEGRATING ORAL EVERY 6 HOURS PRN
Status: DISCONTINUED | OUTPATIENT
Start: 2021-12-09 | End: 2021-12-10 | Stop reason: HOSPADM

## 2021-12-09 RX ORDER — MULTIPLE VITAMINS W/ MINERALS TAB 9MG-400MCG
1 TAB ORAL DAILY
Status: DISCONTINUED | OUTPATIENT
Start: 2021-12-09 | End: 2021-12-10 | Stop reason: HOSPADM

## 2021-12-09 RX ORDER — ACETAMINOPHEN 325 MG/1
650 TABLET ORAL EVERY 4 HOURS PRN
Status: DISCONTINUED | OUTPATIENT
Start: 2021-12-09 | End: 2021-12-10 | Stop reason: HOSPADM

## 2021-12-09 RX ORDER — AMOXICILLIN 250 MG
1 CAPSULE ORAL 2 TIMES DAILY PRN
Status: DISCONTINUED | OUTPATIENT
Start: 2021-12-09 | End: 2021-12-10 | Stop reason: HOSPADM

## 2021-12-09 RX ORDER — DIPHENHYDRAMINE HCL 50 MG
50 CAPSULE ORAL AT BEDTIME
Status: DISCONTINUED | OUTPATIENT
Start: 2021-12-09 | End: 2021-12-10 | Stop reason: HOSPADM

## 2021-12-09 RX ORDER — FOLIC ACID 1 MG/1
1 TABLET ORAL DAILY
Status: DISCONTINUED | OUTPATIENT
Start: 2021-12-09 | End: 2021-12-10 | Stop reason: HOSPADM

## 2021-12-09 RX ORDER — FOLIC ACID 1 MG/1
1 TABLET ORAL DAILY
Qty: 30 TABLET | Refills: 0 | Status: SHIPPED | OUTPATIENT
Start: 2021-12-10 | End: 2024-07-22

## 2021-12-09 RX ORDER — LANOLIN ALCOHOL/MO/W.PET/CERES
100 CREAM (GRAM) TOPICAL DAILY
Qty: 30 TABLET | Refills: 0 | Status: SHIPPED | OUTPATIENT
Start: 2021-12-10 | End: 2022-04-12

## 2021-12-09 RX ORDER — NICOTINE 21 MG/24HR
1 PATCH, TRANSDERMAL 24 HOURS TRANSDERMAL DAILY
Status: DISCONTINUED | OUTPATIENT
Start: 2021-12-09 | End: 2021-12-10 | Stop reason: HOSPADM

## 2021-12-09 RX ADMIN — DIPHENHYDRAMINE HYDROCHLORIDE 50 MG: 50 CAPSULE ORAL at 21:28

## 2021-12-09 RX ADMIN — MELATONIN TAB 3 MG 9 MG: 3 TAB at 21:28

## 2021-12-09 RX ADMIN — ATENOLOL 50 MG: 50 TABLET ORAL at 02:47

## 2021-12-09 RX ADMIN — ACETAMINOPHEN 650 MG: 325 TABLET, FILM COATED ORAL at 08:12

## 2021-12-09 RX ADMIN — TRAZODONE HYDROCHLORIDE 50 MG: 50 TABLET ORAL at 00:56

## 2021-12-09 RX ADMIN — FOLIC ACID 1 MG: 1 TABLET ORAL at 08:12

## 2021-12-09 RX ADMIN — ONDANSETRON 4 MG: 4 TABLET, ORALLY DISINTEGRATING ORAL at 06:27

## 2021-12-09 RX ADMIN — THIAMINE HCL TAB 100 MG 100 MG: 100 TAB at 08:12

## 2021-12-09 RX ADMIN — DIAZEPAM 5 MG: 5 TABLET ORAL at 04:54

## 2021-12-09 RX ADMIN — DIAZEPAM 10 MG: 5 TABLET ORAL at 02:46

## 2021-12-09 RX ADMIN — DIAZEPAM 5 MG: 5 TABLET ORAL at 00:56

## 2021-12-09 RX ADMIN — NICOTINE 1 PATCH: 21 PATCH, EXTENDED RELEASE TRANSDERMAL at 13:12

## 2021-12-09 RX ADMIN — DIAZEPAM 10 MG: 5 TABLET ORAL at 08:12

## 2021-12-09 RX ADMIN — DIAZEPAM 5 MG: 5 TABLET ORAL at 00:08

## 2021-12-09 RX ADMIN — MULTIPLE VITAMINS W/ MINERALS TAB 1 TABLET: TAB at 08:12

## 2021-12-09 ASSESSMENT — MIFFLIN-ST. JEOR: SCORE: 1719.11

## 2021-12-09 ASSESSMENT — ACTIVITIES OF DAILY LIVING (ADL)
LAUNDRY: UNABLE TO COMPLETE
HEARING_DIFFICULTY_OR_DEAF: NO
DOING_ERRANDS_INDEPENDENTLY_DIFFICULTY: NO
TOILETING_ISSUES: NO
DRESS: INDEPENDENT
FALL_HISTORY_WITHIN_LAST_SIX_MONTHS: NO
DIFFICULTY_EATING/SWALLOWING: NO
WALKING_OR_CLIMBING_STAIRS_DIFFICULTY: NO
WEAR_GLASSES_OR_BLIND: YES
CONCENTRATING,_REMEMBERING_OR_MAKING_DECISIONS_DIFFICULTY: NO
DRESSING/BATHING_DIFFICULTY: NO
ORAL_HYGIENE: INDEPENDENT
HYGIENE/GROOMING: INDEPENDENT
VISION_MANAGEMENT: WEARING CONTACTS
PATIENT_/_FAMILY_COMMUNICATION_STYLE: SPOKEN LANGUAGE (ENGLISH OR BILINGUAL)
DIFFICULTY_COMMUNICATING: NO

## 2021-12-09 NOTE — PROGRESS NOTES
[place] Prescription Monitoring Program  Cristobal Jorge, 41M  Refine Search  Contact the [facility]/Help Center  YOB: 1980  Recent Address:  View Linked Records (2)  Other Tools/Metrics  NarxCare  Report generated on 12/09/2021. Report Date Range: 12/09/2020 - 12/09/2021  [place]  Narx Scores  Narcotic  060  Sedative  110  Stimulant  000  Explanation and Guidance  Overdose Risk Score  250  (Range 000-999)  Explanation and Guidance  State Indicators (0)  Details  RX Graph   Narcotic   Buprenorphine   Sedative   Stimulant   Other  Learn how to use graph  All Prescribers  Prescribers  2 - Anabella Allison,  1 - Stacie Redman  Timeline  12/09  2m  6m  1y  2y  Disclaimer  Morphine Milligram Equivalent Prescribed Over Time  Last 30 Days  Last 60 Days  Last 90 Days  Last 1 Year  Last 2 Years  MME  Timeframe  0  1  2  11/10/21  11/25/21  12/9/21  0  MME per Day Avg.  0  MME per RX  Disclaimer  Lorazepam MgEq (LME) Prescribed Over Time  Last 30 Days  Last 60 Days  Last 90 Days  Last 1 Year  Last 2 Years  LME  Timeframe  0  1  2  11/10/21  11/25/21  12/9/21  0  LME Per Day Avg.  0  LME mg Per Rx  Disclaimer  Buprenorphine (mg) Prescribed Over Time  Last 30 Days  Last 60 Days  Last 90 Days  Last 1 Year  Last 2 Years  mg  Timeframe  0  1  2  11/10/21  11/25/21  12/9/21  0  mg Per Day Avg.  0  Avg mg Per Rx  Disclaimer  RX Summary  Summary  Total Prescriptions 2  Total Private Pay 0  Total Prescribers 2  Total Pharmacies 2  Opioids* (excluding Buprenorphine)  Current Qty 0  Current MME/day 0.00  30 Day Avg MME/day 0.00  Buprenorphine*  Current Qty 0  Current mg/day 0.00  30 Day Avg mg/day 0.00  RX Summary Expanded  Narcotics (excluding Buprenorphine)  30 Day Avg. MME 0.00  90 Day Avg. MME 0.00  Rx Count/12 Months 0  Prescriber #/6 Months 0  Pharmacy #/6 Months 0  Current Quantity 0  Buprenorphine  30 Day Avg. mg/day 0.00  90 Day Avg. mg/day 0.00  Rx Count/12 Months 0  Prescriber #/6  Months 0  Pharmacy #/6 Months 0  Current Quantity 0  Sedatives  30 Day Avg. LME 0.00  90 Day Avg. LME 0.17  Rx Count/12 Months 2  Prescriber #/6 Months 2  Pharmacy #/6 Months 2  Current Quantity 0  Stimulants  30 Day Avg. mg/day 0.00  90 Day Avg. mg/day 0.00  Rx Count/12 Months 0  Prescriber #/6 Months 0  Pharmacy #/6 Months 0  Current Quantity 0  Prescriptions  Total: 2  Private Pay: 0  Showing 1-2 of 2 Items View 15 Items  1 of 1   Filled  Written  Sold  ID  Drug  QTY  Days  Prescriber  RX #  Dispenser  Refill  Daily Dose*  Pymt Type     09/30/2021 09/30/2021 09/30/2021 1   Diazepam 10 Mg Tablet  10.00 3 Sh Spe [id number] [facility] (5562) 0/0 3.33 LME Comm [place]  09/30/2021 09/29/2021 09/30/2021 2   Diazepam 5 Mg Tablet  10.00 3 Ki De [id number] [facility] (5707) 0/0 1.67 LME Comm Ins [place]  Disclaimer  Showing 1-2 of 2 Items View 15 Items  1 of 1   Providers  Total: 2  Showing 1-2 of 2 Items View 15 Items  1 of 1   Name  Address  City  State  Zipcode  Phone   Anabella Allison MD [address] [facility] [address] [phone]  Stacie De Sal [address] [facility] [address] [phone]  Showing 1-2 of 2 Items View 15 Items  1 of 1   Pharmacies  Total: 2  Showing 1-2 of 2 Items View 15 Items  1 of 1   Name  Address  City  State  Zipcode  Phone   [facility] (8701) [address] [facility] [id number] [phone]  [facility] (7298) [address] [facility] [address] [phone]  Showing 1-2 of 2 Items View 15 Items  1 of 1   The report provided is based upon the search criteria entered and the corresponding data as it has been reported by dispenser(s). If erroneous information is identified or additional information is needed, please contact the dispenser or the prescriber provided on the report. Date Sold signifies the date the prescription was sold (left the pharmacy). The absence of Date Sold does not necessarily indicate the prescription was not dispensed. Fill Date represents the date the medication was  filled or prepared by the pharmacy. Note, federal regulation (CFR Title 42: Part 2) requires patient consent prior to releasing certain patient data from federally funded opioid treatment programs (OTPs). As such, controlled substances dispensed from OTPs for medication-assisted treatment may not appear in the [place]  report. Morphine milligram equivalent (MME) conversion factors published by the CDC are used in the MME calculation. Per the CDC, the MME conversion factor is intended only for analytic purposes where prescription data are used to retrospectively calculate daily MME to inform analyses of risks associated with opioid prescribing. This value does not constitute clinical guidance or recommendations for converting patients from one form of opioid analgesic to another. Per the CDC, the conversion factors for drugs prescribed or provided, as part of medication-assisted treatment for opioid use disorder should not be used to benchmark against MME dosage thresholds meant for opioids prescribed for pain. Buprenorphine products listed in the CDC s MME file do not have an associated conversion factor. Lastly, the CDC notes, in clinical practice, calculating MME for methadone often involves a sliding-scale approach, whereby the conversion factor increases with increasing dose. The conversion factor of 3 for methadone presented in this file could underestimate MME for a given patient. This report contains confidential information, including patient identifiers, and is not a public record. The information on this report must be treated as protected health information and is only to be disclosed to others as authorized by applicable state and Federal regulations.

## 2021-12-09 NOTE — PROGRESS NOTES
12/09/21 0050   Patient Belongings   Did you bring any home meds/supplements to the hospital?  No   Patient Belongings locker   Patient Belongings Put in Hospital Secure Location (Security or Locker, etc.) cash/credit card;cell phone/electronics;clothing;money (see comment);wallet;shoes   Belongings Search Yes   Clothing Search Yes   Second Staff Khari ENRIQUEZ   Comment All of patient's belongings are in a bin and his discharge bin. His important cards and cash of $30.00 are  in a  security envelop.     BIN:  Pants, belt, shirt, jacket, shoes and socks, a small bag with candy, vape and a recovery coin, a back pack with some clothing, sunglasses, candies, earphone and some personal stuff.    DISCHARGE BIN:  Phone and wallet    SECURITY ENV # [id number] - [place] Drivers License, [employer] Card, [employer] Card, 3 MasterCards, [employer] Red Card, Cash $30.00    A               Admission:  I am responsible for any personal items that are not sent to the safe or pharmacy.  [facility] is not responsible for loss, theft or damage of any property in my possession.    Signature:  _________________________________ Date: _______  Time: _____                                              Staff Signature:  ____________________________ Date: ________  Time: _____      2nd Staff person, if patient is unable/unwilling to sign:    Signature: ________________________________ Date: ________  Time: _____     Discharge:  [facility] has returned all of my personal belongings:    Signature: _________________________________ Date: ________  Time: _____                                          Staff Signature:  ____________________________ Date: ________  Time: _____

## 2021-12-09 NOTE — PROGRESS NOTES
MSSA score of 8/9/8, pt received 20 mgs of valium for alcohol withdrawal and is observed to sleep restlessly during the noc. Zofran ODT given for nausea per pt request.

## 2021-12-09 NOTE — TELEPHONE ENCOUNTER
S: Lloyd, [facility], 41/M, alcohol detox     B: Pt reports hx of alcoholism, relapse a week ago, a pint of vodka daily since. JOSE before arrival   Breath .314   No hx of sz or DTs   Pt reports hx of dep, no acute mental health concerns, denies SI   No ongoing med concerns     Medically cleared, eating, drinking, ambulating indep  Patient cleared and ready for behavioral bed placement: Yes   No covid concerns, test processing     A: Voluntary     R: SAMANTHA/SHASHI/Dorie     661pm Galina Landaverde, on call provider, paged   983pm - Saima accepts   Pt placed in queue   747pm - unit charge notified, report when covid is neg   750pm - ED charge notified via text page

## 2021-12-09 NOTE — PROVIDER NOTIFICATION
Pt continues to have an elevated pulse currently 127 BPM, MSSA score of 9, pt is given 10mg of valium and 50mg of Atenolol per protocol. Medicine cross-cover notified and no new orders obtained. Will continue to monitor.

## 2021-12-09 NOTE — H&P
Psychiatry History and Physical    Cristobal Jorge MRN# [id number]   Age: 41 year old YOB: 1980     Date of Admission:  12/8/2021          Assessment:   This patient is a 41 year old  male with history of Alcohol Use Disorder, depression, and anxiety who presented to ED seeking detox from alcohol. Family history significant for suspected alcoholism in both parents. Psychosocial stressors include: pandemic, physical strain r/t work, recent move to [place]. Patient was admitted on a voluntary basis to [facility] detox. Patient was started on MSSA protocol using Valium. Pt  does not have a history of DTs or seizures. Thiamine, folic acid, and multivitamin were ordered on admission. IM consult placed for co-management of care. Symptoms and presentation at this time is most consistent with Alcohol Use Disorder, severe, in withdrawal, unspecified complication. Patient will likely benefit from increased CD supports upon discharge. CTC will be meeting with patient to discuss dispo plan. Will consider anti-craving medications prior to discharge. Inpatient psychiatric hospitalization is warranted at this time for safety, stabilization, and possible adjustment in medications. Patient would like to discharge tomorrow AM if out of detox at that time. His wife is available to provide transportation. He is not interested in anti-craving medications. Reviewed options, including Antabuse, naltrexone, and Campral. Has OP CD program arranged, though CTC will coordinate with [facility] to confirm.          Diagnoses:     Alcohol Use Disorder, severe, in withdrawal, unspecified complication  Nicotine Use Disorder, moderate  Anxiety, unspecified (alcohol induced vs Generalized Anxiety Disorder)  Depressive Disorder, unspecified (Alcohol induced vs MDD, recurrent, mild)         Plan:   Psychiatric treatment/inteventions:  Alcohol Withdrawal:  - Continue MSSA protocol using Valium for management of  alcohol withdrawal  - Continue thiamine, folate, and multivitamin daily  - Consider anti-craving medications prior to discharge. Patient declined.   - Continue prn Zofran as needed for nausea   - Seizure and withdrawal precautions in place     reviewed    Depression/Anxiety symptoms:  - Continue hydroxyzine 25 mg q4h prn for acute anxiety  - Continue Trazodone 50 mg at bedtime prn for sleep disturbances   - Add scheduled Benadryl 50 mg at bedtime per pt request  - Add scheduled melatonin 9 mg at bedtime per pt request    Patient will be treated in therapeutic milieu with appropriate individual and group therapies as described.    Nicotine Use Disorder: Replacement available    Medical treatment/interventions:  Medical concerns: Alcohol withdrawal  Consults: Routine IM consult placed. Appreciate assistance.  Labs: Reviewed. Will add GGT, TSH, and lipid profile    Legal Status: Voluntary    Safety Assessment:   Checks: Status 15  Pt has not required locked seclusion or restraints in the past 24 hours to maintain safety, please refer to RN documentation for further details.    The risks, benefits, alternatives and side effects have been discussed and are understood by the patient.    Disposition Plan   Reason for ongoing admission: requires detoxification from substance that poses a risk of bodily harm during withdrawal period  Discharge location: TBD. Patient would benefit from increased CD supports.   Discharge Medications: not ordered  Follow-up Appointments: not scheduled  Anticipated length of stay: 2-3 days    Entered by: Jess Ramírez on 12/9/2021 at 10:08 AM              Chief Complaint:     Alcohol Detox         History of Present Illness:     Per ED Provider Note dated 12/8/21:    Cristobal Jorge is a 41 year old male who presents seeking alcohol detox. He was brought here by a friend. He states that 2 months ago he went to [facility] for recovery. He started drinking again last Wednesday. He has  been drinking 1 pint of vodka a day. He denies any episodes of nausea, vomiting or falls. 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 No history of depression or anxiety. He is prescribed Lexapro a year ago but was told he wouldn't need this, is not currently on this medication. He last took it 1 year ago. No history of alcohol withdrawal seizures or DTs. He can't tell if he is experiencing withdrawal yet or not, states he is shaky at baseline. No COVID-19 exposure. No COVID-19 symptoms, no cough or fever. No abdominal pain. He feels nauseated but no vomiting. No history of pancreatitis or alcoholic liver disease. He denies any other substance use. He denies any medical concerns. Per [facility] patient has had 3 doses of COVID-19 vaccination, last dose given on 11/18/21.     42 yo M with a hx of alcoholism presenting with relapse on alcohol use for 1 week.   Vitals stable.   Pt requesting detox admission.   Does not have acute mental health issues or recent traumatic/acute medical issues that would preclude safe admission to the detox unit.   CBC, CMP, lipase and COVID-19 test ordered.   Discussed with detox unit for admission.     Per my interview with patient:    Cristobal went to treatment two months ago at [facility] and remained sober until he relapsed last Wednesday (12/1/2021). States a sober friend drove him to the ED. Has been drinking 1 pint of vodka daily since relapse. Last drink early a.m. on 12/8/21.    Onset of alcohol use in 19. Became problematic in early 2000's.  Alcohol quantity: as above  Alcohol use duration: as above  Longest period of sobriety was: 61 days from 9/30-10/30/21.   BAL in ED:0.314  Urine drug screen: NEGATIVE    Patient has tolerance, withdrawal, progressive use, loss of control, spending more time and more amount than intended. Patient has made attempts to quit, is experiencing cravings, and reports negative consequences.  Patient does not  torres.    Patient does not have a history of seizures.  Patient does not have a history of delirium tremens.    Patient does not have a history of overdose.  Patient does not have a history of IV use.  Patient does not have a history of hepatitis, HIV, abscesses.                Psychiatric Review of Systems:   Depression:   Reports: lack of motivation, reduced appetite when drinking excessively, otherwise good, decreased energy  Denies: depressed mood, suicidal ideation, decreased interest, changes in sleep, guilt, hopelessness, helplessness, impaired concentration, irritability.  Maxine:   Denies: sleeplessness, increased goal-directed activities, abrupt increase in energy, pressured speech  Psychosis:   Denies: visual hallucinations, auditory hallucinations, paranoia, grandiosity, ideas of reference, thought insertions, thought broadcasting.  Anxiety:   Denies: worries that are difficult to control for the past 6 months, panic attacks  PTSD:   Denies: re-experiencing past trauma, nightmares, increased arousal, avoidance of traumatic stimuli, impaired function.  OCD:   Denies: obsessions, checking, symmetry, cleaning, skin picking.  ED:   Denies: restriction, binging, purging.           Medical Review of Systems:     Review of systems positive for NONE  10 point review of systems is otherwise negative unless noted above.            Psychiatric History:   Psychiatric Hospitalizations: None  History of Psychosis: None  Prior ECT: None  Court Commitment: None  Suicide Attempts: None  Self-injurious Behavior: None  Violence toward others: None  Use of Psychotropics: Lexapro (unclear effectiveness). Patient was taking it when he was experiencing situational, psychosocial stressors and attributes his anxiety and depressive symptoms to those rather than clinical depression.          Substance Use History:     Alcohol: See HPI  Cannabis: rare use, last use a couple of months ago  Nicotine: Vaping daily (lasts 5-6 days and  "it is reportedly estimated to be about 2,000 puffs)   Cocaine: none  Methamphetamine: none  Opiates/Heroin: none  Benzodiazepines: none  Hallucinogens: none  Inhalants: none         Social History:   Upbringing: Born and raised in [place]. Raised by both parents whom are . Two younger sisters. Described childhood as \"good. Just your standard american childhood.\"  Educational History: Associates degree in [employer] science.   Relationships:  x 17 years. Partner is sober and supportive.   Children: 7 yo son  Current Living Situation: Living with wife and son in [place]. Moved to [place] from [place] in 2019.   [place]   Financial Support: Self  Legal History: 13 years ago  Abuse/Trauma History: Patient denies         Family History:   Mother and father with suspected alcoholism  Denies MI in family  H/o attempted or completed suicides in family: None         Past Medical History:     Past Medical History:   Diagnosis Date     Substance abuse (H)             Past Surgical History:   No past surgical history on file.         Allergies:      Allergies   Allergen Reactions     Amoxicillin Rash and Hives              Medications:   I have reviewed this patient's current medications  Medications Prior to Admission   Medication Sig Dispense Refill Last Dose     methocarbamol (ROBAXIN) 500 MG tablet Take 500 mg by mouth 4 times daily as needed for muscle spasms   Past Month at Unknown time     multivitamin w/minerals (THERA-VIT-M) tablet Take 1 tablet by mouth daily   12/7/2021 at Unknown time             Labs:     Recent Results (from the past 24 hour(s))   Alcohol breath test POCT    Collection Time: 12/08/21  2:30 PM   Result Value Ref Range    Alcohol Breath Test 0.314 (A) 0.00 - 0.01   Comprehensive metabolic panel    Collection Time: 12/08/21  6:03 PM   Result Value Ref Range    Sodium 140 133 - 144 mmol/L    Potassium 3.9 3.4 - 5.3 mmol/L    Chloride 102 94 - 109 mmol/L    " Carbon Dioxide (CO2) 26 20 - 32 mmol/L    Anion Gap 12 3 - 14 mmol/L    Urea Nitrogen 21 7 - 30 mg/dL    Creatinine 0.89 0.66 - 1.25 mg/dL    Calcium 9.5 8.5 - 10.1 mg/dL    Glucose 97 70 - 99 mg/dL    Alkaline Phosphatase 65 40 - 150 U/L    AST 32 0 - 45 U/L    ALT 40 0 - 70 U/L    Protein Total 8.6 6.8 - 8.8 g/dL    Albumin 5.0 3.4 - 5.0 g/dL    Bilirubin Total 0.9 0.2 - 1.3 mg/dL    GFR Estimate >90 >60 mL/min/1.73m2   Lipase    Collection Time: 12/08/21  6:03 PM   Result Value Ref Range    Lipase 135 73 - 393 U/L   CBC with platelets and differential    Collection Time: 12/08/21  6:03 PM   Result Value Ref Range    WBC Count 15.2 (H) 4.0 - 11.0 10e3/uL    RBC Count 6.04 (H) 4.40 - 5.90 10e6/uL    Hemoglobin 18.6 (H) 13.3 - 17.7 g/dL    Hematocrit 50.7 40.0 - 53.0 %    MCV 84 78 - 100 fL    MCH 30.8 26.5 - 33.0 pg    MCHC 36.7 (H) 31.5 - 36.5 g/dL    RDW 11.3 10.0 - 15.0 %    Platelet Count 304 150 - 450 10e3/uL    % Neutrophils 60 %    % Lymphocytes 34 %    % Monocytes 4 %    % Eosinophils 1 %    % Basophils 1 %    % Immature Granulocytes 0 %    NRBCs per 100 WBC 0 <1 /100    Absolute Neutrophils 9.0 (H) 1.6 - 8.3 10e3/uL    Absolute Lymphocytes 5.2 0.8 - 5.3 10e3/uL    Absolute Monocytes 0.7 0.0 - 1.3 10e3/uL    Absolute Eosinophils 0.2 0.0 - 0.7 10e3/uL    Absolute Basophils 0.1 0.0 - 0.2 10e3/uL    Absolute Immature Granulocytes 0.1 <=0.4 10e3/uL    Absolute NRBCs 0.0 10e3/uL   RBC and Platelet Morphology    Collection Time: 12/08/21  6:03 PM   Result Value Ref Range    Platelet Assessment  Automated Count Confirmed. Platelet morphology is normal.     Automated Count Confirmed. Platelet morphology is normal.    RBC Morphology Confirmed RBC Indices    Asymptomatic COVID-19 Virus (Coronavirus) by PCR Nasopharyngeal    Collection Time: 12/08/21  6:04 PM    Specimen: Nasopharyngeal; Swab   Result Value Ref Range    SARS CoV2 PCR Negative Negative, Testing sent to reference lab. Results will be returned via  "unsolicited result   Drug abuse screen 1 urine (ED)    Collection Time: 12/08/21  6:04 PM   Result Value Ref Range    Amphetamines Urine Screen Negative Screen Negative    Barbiturates Urine Screen Negative Screen Negative    Benzodiazepines Urine Screen Negative Screen Negative    Cannabinoids Urine Screen Negative Screen Negative    Cocaine Urine Screen Negative Screen Negative    Opiates Urine Screen Negative Screen Negative   Extra Red Top Tube    Collection Time: 12/08/21  6:05 PM   Result Value Ref Range    Hold Specimen JIC        /83   Pulse 69   Temp 97.9  F (36.6  C) (Temporal)   Resp 16   Ht 1.829 m (6')   Wt 77.1 kg (170 lb)   SpO2 99%   BMI 23.06 kg/m    Weight is 170 lbs 0 oz  Body mass index is 23.06 kg/m .         Psychiatric Mental Status Examination:   Appearance: awake, alert  Attitude: cooperative and pleasant  Eye Contact: good  Mood:  \"pretty good\"  Affect: mood congruent and full range  Speech:  clear, coherent and normal prosody  Language: fluent in English  Psychomotor Behavior:  no evidence of tardive dyskinesia, dystonia, or tics  Gait/Station: normal  Thought Process:  linear, logical, goal oriented  Associations:  no loose associations  Thought Content:  Denying SI/HI/AVH; no evidence of psychotic thinking  Insight:  good  Judgement: intact  Oriented to:  time, person, and place  Attention Span and Concentration:  intact  Recent and Remote Memory:  intact  Fund of Knowledge: appropriate    Clinical Global Impressions  First:7     Most recent:5              Physical Exam:   Please refer to physical exam completed by ED provider, Aranza Desai MD, on 12/8/21. I agree with the findings and assessment and have no additional findings to add at this time.     "

## 2021-12-09 NOTE — ED NOTES
ED to Behavioral Floor Handoff    SITUATION  Cristobal Jorge is a 41 year old male who speaks English and lives in a home unknown The patient arrived in the ED by private car from home with a complaint of Drug / Alcohol Assessment  .The patient's current symptoms started/worsened a few days ago and during this time the symptoms have remained the same.   In the ED, pt was diagnosed with   Final diagnoses:   None        Initial vitals were: BP: 135/86  Pulse: (!) 122  Temp: 97.7  F (36.5  C)  Resp: 16  Weight: 75 kg (165 lb 6.4 oz)  SpO2: 97 %   --------  Is the patient diabetic? No   If yes, last blood glucose? --     If yes, was this treated in the ED? --  --------  Is the patient inebriated (ETOH) Yes or Impaired on other substances? No  MSSA done? Yes  Last MSSA score: 8    Were withdrawal symptoms treated? Yes  Does the patient have a seizure history? No. If yes, date of most recent seizure--  --------  Is the patient patient experiencing suicidal ideation? denies current or recent suicidal ideation     Homicidal ideation? denies current or recent homicidal ideation or behaviors.    Self-injurious behavior/urges? denies current or recent self injurious behavior or ideation.  ------  Was pt aggressive in the ED No  Was a code called No  Is the pt now cooperative? Yes  -------  Meds given in ED:   Medications   diazepam (VALIUM) tablet 5-20 mg (5 mg Oral Given 12/8/21 1756)   thiamine (B-1) tablet 100 mg (has no administration in time range)   folic acid (FOLVITE) tablet 1 mg (has no administration in time range)   multivitamin w/minerals (THERA-VIT-M) tablet 1 tablet (has no administration in time range)   nicotine (NICODERM CQ) 21 MG/24HR 24 hr patch 1 patch (1 patch Transdermal Patch/Med Applied 12/8/21 1757)   ondansetron (ZOFRAN-ODT) ODT tab 4 mg (4 mg Oral Given 12/8/21 1943)      Family present during ED course? No  Family currently present? No    BACKGROUND  Does the patient have a cognitive impairment  or developmental disability? No  Allergies:   Allergies   Allergen Reactions     Amoxicillin Rash and Hives   .   Social demographics are   Social History     Socioeconomic History     Marital status:      Spouse name: Not on file     Number of children: Not on file     Years of education: Not on file     Highest education level: Not on file   Occupational History     Not on file   Tobacco Use     Smoking status: Not on file     Smokeless tobacco: Not on file   Substance and Sexual Activity     Alcohol use: Not on file     Drug use: Not on file     Sexual activity: Not on file   Other Topics Concern     Not on file   Social History Narrative     Not on file     Social Determinants of Health     Financial Resource Strain: Not on file   Food Insecurity: Not on file   Transportation Needs: Not on file   Physical Activity: Not on file   Stress: Not on file   Social Connections: Not on file   Intimate Partner Violence: Not on file   Housing Stability: Not on file        ASSESSMENT  Labs results   Labs Ordered and Resulted from Time of ED Arrival to Time of ED Departure   CBC WITH PLATELETS AND DIFFERENTIAL - Abnormal       Result Value    WBC Count 15.2 (*)     RBC Count 6.04 (*)     Hemoglobin 18.6 (*)     Hematocrit 50.7      MCV 84      MCH 30.8      MCHC 36.7 (*)     RDW 11.3      Platelet Count 304      % Neutrophils 60      % Lymphocytes 34      % Monocytes 4      % Eosinophils 1      % Basophils 1      % Immature Granulocytes 0      NRBCs per 100 WBC 0      Absolute Neutrophils 9.0 (*)     Absolute Lymphocytes 5.2      Absolute Monocytes 0.7      Absolute Eosinophils 0.2      Absolute Basophils 0.1      Absolute Immature Granulocytes 0.1      Absolute NRBCs 0.0     ALCOHOL BREATH TEST POCT - Abnormal    Alcohol Breath Test 0.314 (*)    COMPREHENSIVE METABOLIC PANEL - Normal    Sodium 140      Potassium 3.9      Chloride 102      Carbon Dioxide (CO2) 26      Anion Gap 12      Urea Nitrogen 21      Creatinine  0.89      Calcium 9.5      Glucose 97      Alkaline Phosphatase 65      AST 32      ALT 40      Protein Total 8.6      Albumin 5.0      Bilirubin Total 0.9      GFR Estimate >90     LIPASE - Normal    Lipase 135     DRUG ABUSE SCREEN 1 URINE (ED) - Normal    Amphetamines Urine Screen Negative      Barbiturates Urine Screen Negative      Benzodiazepines Urine Screen Negative      Cannabinoids Urine Screen Negative      Cocaine Urine Screen Negative      Opiates Urine Screen Negative     RBC AND PLATELET MORPHOLOGY    Platelet Assessment        Value: Automated Count Confirmed. Platelet morphology is normal.    RBC Morphology Confirmed RBC Indices     COVID-19 VIRUS (CORONAVIRUS) BY PCR      Imaging Studies: No results found for this or any previous visit (from the past 24 hour(s)).   Most recent vital signs /84   Pulse 98   Temp 98.4  F (36.9  C) (Oral)   Resp 16   Wt 75 kg (165 lb 6.4 oz)   SpO2 97%    Abnormal labs/tests/findings requiring intervention:---   Pain control: pt had none  Nausea control: good    RECOMMENDATION  Are any infection precautions needed (MRSA, VRE, etc.)? No If yes, what infection? --  ---  Does the patient have mobility issues? independently. If yes, what device does the pt use? ---  ---  Is patient on 72 hour hold or commitment? No If on 72 hour hold, have hold and rights been given to patient? N/A  Are admitting orders written if after 10 p.m. ?N/A  Tasks needing to be completed: none    Padmini Escamilla, RN    [phone] [facility]   [phone] [facility]

## 2021-12-09 NOTE — PROGRESS NOTES
Met with pt to discuss aftercare plans. Pt reports he is here for detox only and is set up for outpatient treatment at [facility] in [facility] in [facility]. States he was previously in residential treatment at [facility] and was referred for aftercare by [facility] for [facility] in [facility]. States that there were some insurance issues that prevented him from starting this aftercare program and pt relapsed during interim. Pt state the insurance has now been approved and he would like to be scheduled for intake after discharge. Pt signed CECI for program and writer placed call to coordinate admission. Spoke with program and was told Stefany will call when she is in the office to schedule. 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 Pt is hopeful to discharge hospital as-soon-as-possible and return home with plan to follow-up with outpatient at [facility] in [facility]. Pt has [facility] for insurance, AVS initiated. Waiting for return call from Stefany to schedule intake.

## 2021-12-09 NOTE — ED NOTES
Sepsis BPA alert has fired and lactate was ordered No   If not, the reason was MD confirmed not sepsis and Dr. Stafford was notified.  Vitals 24 hr (last day)     Date/Time Temp Resp Pulse Pulse Rate Source BP    12/08/21 2009 98 (36.7) 16 131 Monitor 110/75    12/08/21 1745 98.4 (36.9) 16 98 Monitor 135/84    12/08/21 1401 97.7 (36.5) 16 122 Monitor 135/86        WBC   Date Value Ref Range Status   02/06/2021 11.5 (H) 4.0 - 11.0 10e9/L Final     WBC Count   Date Value Ref Range Status   12/08/2021 15.2 (H) 4.0 - 11.0 10e3/uL Final

## 2021-12-09 NOTE — PLAN OF CARE
Behavioral Team Discussion: (12/9/2021)    Continued Stay Criteria/Rationale: Patient admitted for alcohol withdrawal and Use Disorder.  Plan: The following services will be provided to the patient; psychiatric assessment, medication management, therapeutic milieu, individual and group support, and skills groups.   Participants: 3A Provider: Dr. Edwadr Oshea MD; 3A RN: Shane Glover, KATHY; 3A CM's: Britany Willis and Mona Rivas.  Summary/Recommendation: Providers will assess today for treatment recommendations, discharge planning, and aftercare plans. CM will meet with pt for discharge planning.   Medical/Physical: none noted  Precautions:   Behavioral Orders   Procedures     Code 1 - Restrict to Unit     Fall precautions     Routine Programming     As clinically indicated     Status 15     Every 15 minutes.     Withdrawal precautions     Rationale for change in precautions or plan: N/A  Progress: Initial.

## 2021-12-09 NOTE — PLAN OF CARE
Patient admitted voluntarily to [facility] for alcohol detox.    Patient went to treatment two months ago at [facility] and remained sober until he relapsed last Wednesday (12/1/2021). States a sober friend drove him to the ED. Has been drinking 1 pint of vodka daily since replapse. Last drink early a.m. on 12/8/21. Breath test on arrival to ED at 14:30 was 0.314. Denies history of seizures or DT's. Denies other substance use. UDS negative.     Received a total of Valium 35 mg in ED prior to arrival on unit.     AST 32  ALT 40    Monitor on MSSA with Valium and withdrawal precautions. MSSA on admission was 8. Patient has been tachycardic. Received Valium 5 mg.     Patient states he experiences bouts of insomnia. Had been taking Melatonin. Provided PRN Trazodone 50 mg.     Patient was calm and cooperative on admission. Denies SI, HI, SIB and psychotic symptoms. Was oriented to the unit and went over admission folder. Patient went to bed after interview. Endorsed no questions or concerns.    IM consult ordered per unit policy.

## 2021-12-09 NOTE — CONSULTS
"  [facility]  Internal Medicine Consult     Cristobal Jorge MRN# [id number]   Age: 41 year old YOB: 1980     Date of Admission: 12/8/2021  Date of Consult:  12/9/2021    Primary Care Provider: No Ref-Primary, Physician    Requesting Service: Psychiatry  Reason for Consult: General Medical Evaluation         Assessment and Recommendations:   Cristobal Jorge is a 41 year old male with a history of alcohol abuse who was admitted to [facility] station 3 a seeking detox from alcohol    #Alcohol abuse, dependence, acute withdrawal.  Management per primary team, psychiatry.  Labs significant for mild elevation in WBC (15.2) and hemoglobin (18.6), could represent hemoconcentration in the setting of poor p.o. intake.  No fevers or systemic symptoms to suggest infection. VSS.  -Agree with MSSA using Valium  -Agree with thiamine, folic acid, MVI  -Would repeat CBC if fever or other signs/symptoms of infection develop      Internal Medicine will sign off at this time. Please notify if any intercurrent medical questions or concerns arise. Thank you for involving me in this patients care.         Vicky French PA-C  Hospitalist Service  [phone]           Chief Complaint:   \"Alcohol\"         History of Present Illness:   Cristobal Jorge is a 41 year old male with a history of alcohol abuse who was admitted to [facility] station 3 a seeking detox from alcohol.  Pt states that he recently was in a 30 day treatment program. When he discharged he maintained sobriety for an additional 30 days, but then slipped back into daily drinking again. He endorses recent use of 1 pint of vodka daily. He denies any other drug use. He states that he is anxious to get through withdrawal so that he can discharge. He has sobriety support in place.   He currently denies any symptoms of acute withdrawal and is surprised that he feels \"pretty good\". He specifically denies any chest pain, shortness of breath " or difficulty breathing. He is not having any fevers, abdominal pain, nausea or vomiting. He is eating and drinking without issue.          Review of Systems:   A 10 point review of systems was performed and is negative unless otherwise noted in HPI.          Past Medical History:     Past Medical History:   Diagnosis Date     Substance abuse (H)             Past Surgical History:    No past surgical history on file.          Family History:   No family history on file.          Social History:     Social History     Tobacco Use     Smoking status: Not on file     Smokeless tobacco: Not on file   Substance Use Topics     Alcohol use: Not on file             Medications:     Current Facility-Administered Medications   Medication     acetaminophen (TYLENOL) tablet 650 mg     alum & mag hydroxide-simethicone (MAALOX) suspension 30 mL     atenolol (TENORMIN) tablet 50 mg     diazepam (VALIUM) tablet 5-20 mg     folic acid (FOLVITE) tablet 1 mg     multivitamin w/minerals (THERA-VIT-M) tablet 1 tablet     nicotine (NICODERM CQ) 21 MG/24HR 24 hr patch 1 patch     nicotine (NICORETTE) gum 2 mg     nicotine Patch in Place     ondansetron (ZOFRAN-ODT) ODT tab 4 mg     senna-docusate (SENOKOT-S/PERICOLACE) 8.6-50 MG per tablet 1 tablet     thiamine (B-1) tablet 100 mg     traZODone (DESYREL) tablet 50 mg            Allergies:     Allergies   Allergen Reactions     Amoxicillin Rash and Hives             Physical Exam:   /83   Pulse 69   Temp 97.9  F (36.6  C) (Temporal)   Resp 16   Ht 1.829 m (6')   Wt 77.1 kg (170 lb)   SpO2 99%   BMI 23.06 kg/m     GENERAL: Alert and oriented x 3. NAD.   HEENT: Anicteric sclera. Mucous membranes moist.   CV: RRR. S1, S2. No murmurs appreciated.   RESPIRATORY: Effort normal on room air. Lungs CTAB with no wheezing, rales, rhonchi.   GI: Abdomen soft and non distended with normoactive bowel sounds present in all quadrants. No tenderness, rebound, guarding.   MUSCULOSKELETAL: No  joint swelling or tenderness. Moves all extremities.   NEUROLOGICAL: No focal deficits. Strength 5/5 bilaterally in upper and lower extremities.   EXTREMITIES: No peripheral edema. Intact bilateral pedal pulses.   SKIN: No jaundice. No rashes.          Labs:   CBC:  Recent Labs   Lab Test 12/08/21  1803   WBC 15.2*   RBC 6.04*   HGB 18.6*   HCT 50.7   MCV 84   MCH 30.8   MCHC 36.7*   RDW 11.3          CMP:  Recent Labs   Lab Test 12/08/21  1803      POTASSIUM 3.9   CHLORIDE 102   MARIAH 9.5   CO2 26   BUN 21   CR 0.89   GLC 97   AST 32   ALT 40   BILITOTAL 0.9   ALBUMIN 5.0   PROTTOTAL 8.6   ALKPHOS 65               Vicky French PA-C  Internal Medicine [facility] Hospitalist   [facility]   Pager: [phone]

## 2021-12-09 NOTE — PLAN OF CARE
"Patient has experienced a largely positive day on Velasquez 3A.  He reports absence of all suicidal ideation while on velasquez.  Receiving medication per protocol for his alcohol withdrawal, patient already states that he is \"feeling a lot better\" since his arrival on-velasquez.  He also reports looking forward to his eventual discharge to care program, when therapeutically appropriate.  Will continue to monitor as prudent.    "

## 2021-12-10 VITALS
OXYGEN SATURATION: 96 % | RESPIRATION RATE: 16 BRPM | BODY MASS INDEX: 23.03 KG/M2 | WEIGHT: 170 LBS | TEMPERATURE: 97.5 F | HEIGHT: 72 IN | SYSTOLIC BLOOD PRESSURE: 112 MMHG | HEART RATE: 87 BPM | DIASTOLIC BLOOD PRESSURE: 79 MMHG

## 2021-12-10 LAB
CHOLEST SERPL-MCNC: 210 MG/DL
GGT SERPL-CCNC: 47 U/L (ref 0–75)
HDLC SERPL-MCNC: 64 MG/DL
LDLC SERPL CALC-MCNC: 100 MG/DL
NONHDLC SERPL-MCNC: 146 MG/DL
TRIGL SERPL-MCNC: 228 MG/DL
TSH SERPL DL<=0.005 MIU/L-ACNC: 1.66 MU/L (ref 0.4–4)

## 2021-12-10 PROCEDURE — 84443 ASSAY THYROID STIM HORMONE: CPT | Performed by: PSYCHIATRY & NEUROLOGY

## 2021-12-10 PROCEDURE — 250N000013 HC RX MED GY IP 250 OP 250 PS 637: Performed by: PSYCHIATRY & NEUROLOGY

## 2021-12-10 PROCEDURE — 36415 COLL VENOUS BLD VENIPUNCTURE: CPT | Performed by: PSYCHIATRY & NEUROLOGY

## 2021-12-10 PROCEDURE — 82465 ASSAY BLD/SERUM CHOLESTEROL: CPT | Performed by: PSYCHIATRY & NEUROLOGY

## 2021-12-10 PROCEDURE — 82977 ASSAY OF GGT: CPT | Performed by: PSYCHIATRY & NEUROLOGY

## 2021-12-10 PROCEDURE — 99239 HOSP IP/OBS DSCHRG MGMT >30: CPT | Performed by: PSYCHIATRY & NEUROLOGY

## 2021-12-10 RX ADMIN — THIAMINE HCL TAB 100 MG 100 MG: 100 TAB at 08:34

## 2021-12-10 RX ADMIN — NICOTINE 1 PATCH: 21 PATCH, EXTENDED RELEASE TRANSDERMAL at 08:35

## 2021-12-10 RX ADMIN — MULTIPLE VITAMINS W/ MINERALS TAB 1 TABLET: TAB at 08:34

## 2021-12-10 RX ADMIN — FOLIC ACID 1 MG: 1 TABLET ORAL at 08:34

## 2021-12-10 ASSESSMENT — ACTIVITIES OF DAILY LIVING (ADL)
LAUNDRY: UNABLE TO COMPLETE
DRESS: INDEPENDENT
HYGIENE/GROOMING: INDEPENDENT
ORAL_HYGIENE: INDEPENDENT

## 2021-12-10 NOTE — PLAN OF CARE
Problem: Adult Inpatient Plan of Care  Goal: Plan of Care Review  Outcome: Improving  Flowsheets (Taken 12/10/2021 0825)  Plan of Care Reviewed With: patient  Goal: Patient-Specific Goal (Individualized)  Outcome: Improving     Problem: Alcohol Withdrawal  Goal: Alcohol Withdrawal Symptom Control  Outcome: Improving     Problem: Behavioral Health Plan of Care  Goal: Plan of Care Review  Outcome: Improving  Flowsheets (Taken 12/10/2021 0825)  Plan of Care Reviewed With: patient  Patient Agreement with Plan of Care: agrees    Patient is pleasant, calm, cooperative with assessment.    MSSA: 3  Denies mental health symptoms.     Good appetite.   Vital signs stable, denies pain.  Staff monitoring patient closely.

## 2021-12-10 NOTE — PROGRESS NOTES
MSSA score of 3,pt did not require medication for alcohol withdrawal and is observed to sleep throughout the noc.

## 2021-12-10 NOTE — PLAN OF CARE
Pt is monitored for alcohol withdrawal, MSSA of 2, 2. Pt was not medicated for withdrawal symptoms this shift. Pt last received valium 12/9 at 08:00 vitals check-in.    Pt hopeful for discharge on Friday 12/10. Pt visibile in milieu and social with peers. Denied SI

## 2021-12-10 NOTE — DISCHARGE SUMMARY
Psychiatric Discharge Summary    Cristobal Jorge MRN# [id number]   Age: 41 year old YOB: 1980     Date of Admission:  12/8/2021  Date of Discharge:  12/10/2021  Admitting Physician:  Jess Ramírez MD  Discharge Physician:  Jess Ramírez MD (Contact: [phone])         Event Leading to Hospitalization:   Per H&P:    Per ED Provider Note dated 12/8/21:     Cristobal Jorge is a 41 year old male who presents seeking alcohol detox. He was brought here by a friend. He states that 2 months ago he went to [facility] for recovery. He started drinking again last Wednesday. He has been drinking 1 pint of vodka a day. He denies any episodes of nausea, vomiting or falls. 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 No history of depression or anxiety. He is prescribed Lexapro a year ago but was told he wouldn't need this, is not currently on this medication. He last took it 1 year ago. No history of alcohol withdrawal seizures or DTs. He can't tell if he is experiencing withdrawal yet or not, states he is shaky at baseline. No COVID-19 exposure. No COVID-19 symptoms, no cough or fever. No abdominal pain. He feels nauseated but no vomiting. No history of pancreatitis or alcoholic liver disease. He denies any other substance use. He denies any medical concerns. Per [facility] patient has had 3 doses of COVID-19 vaccination, last dose given on 11/18/21.      40 yo M with a hx of alcoholism presenting with relapse on alcohol use for 1 week.   Vitals stable.   Pt requesting detox admission.   Does not have acute mental health issues or recent traumatic/acute medical issues that would preclude safe admission to the detox unit.   CBC, CMP, lipase and COVID-19 test ordered.   Discussed with detox unit for admission.      Per my interview with patient:     Cristobal went to treatment two months ago at [facility] and remained sober until he relapsed last Wednesday  (12/1/2021). States a sober friend drove him to the ED. Has been drinking 1 pint of vodka daily since relapse. Last drink early a.m. on 12/8/21.     Onset of alcohol use in 19. Became problematic in early 2000's.  Alcohol quantity: as above  Alcohol use duration: as above  Longest period of sobriety was: 61 days from 9/30-10/30/21.   BAL in ED:0.314  Urine drug screen: NEGATIVE     Patient has tolerance, withdrawal, progressive use, loss of control, spending more time and more amount than intended. Patient has made attempts to quit, is experiencing cravings, and reports negative consequences.  Patient does not torres.     Patient does not have a history of seizures.  Patient does not have a history of delirium tremens.     Patient does not have a history of overdose.  Patient does not have a history of IV use.  Patient does not have a history of hepatitis, HIV, abscesses.          See Admission note by Jess Ramírez MD on 12/9/21 for additional details.          Diagnoses:        Alcohol Use Disorder, severe, withdrawal resolved, unspecified complication  Nicotine Use Disorder, moderate  Anxiety, unspecified (alcohol induced vs Generalized Anxiety Disorder)  Depressive Disorder, unspecified (Alcohol induced vs MDD, recurrent, mild)         Labs:     Recent Results (from the past 168 hour(s))   Alcohol breath test POCT    Collection Time: 12/08/21  2:30 PM   Result Value Ref Range    Alcohol Breath Test 0.314 (A) 0.00 - 0.01   Comprehensive metabolic panel    Collection Time: 12/08/21  6:03 PM   Result Value Ref Range    Sodium 140 133 - 144 mmol/L    Potassium 3.9 3.4 - 5.3 mmol/L    Chloride 102 94 - 109 mmol/L    Carbon Dioxide (CO2) 26 20 - 32 mmol/L    Anion Gap 12 3 - 14 mmol/L    Urea Nitrogen 21 7 - 30 mg/dL    Creatinine 0.89 0.66 - 1.25 mg/dL    Calcium 9.5 8.5 - 10.1 mg/dL    Glucose 97 70 - 99 mg/dL    Alkaline Phosphatase 65 40 - 150 U/L    AST 32 0 - 45 U/L    ALT 40 0 - 70 U/L    Protein Total  8.6 6.8 - 8.8 g/dL    Albumin 5.0 3.4 - 5.0 g/dL    Bilirubin Total 0.9 0.2 - 1.3 mg/dL    GFR Estimate >90 >60 mL/min/1.73m2   Lipase    Collection Time: 12/08/21  6:03 PM   Result Value Ref Range    Lipase 135 73 - 393 U/L   CBC with platelets and differential    Collection Time: 12/08/21  6:03 PM   Result Value Ref Range    WBC Count 15.2 (H) 4.0 - 11.0 10e3/uL    RBC Count 6.04 (H) 4.40 - 5.90 10e6/uL    Hemoglobin 18.6 (H) 13.3 - 17.7 g/dL    Hematocrit 50.7 40.0 - 53.0 %    MCV 84 78 - 100 fL    MCH 30.8 26.5 - 33.0 pg    MCHC 36.7 (H) 31.5 - 36.5 g/dL    RDW 11.3 10.0 - 15.0 %    Platelet Count 304 150 - 450 10e3/uL    % Neutrophils 60 %    % Lymphocytes 34 %    % Monocytes 4 %    % Eosinophils 1 %    % Basophils 1 %    % Immature Granulocytes 0 %    NRBCs per 100 WBC 0 <1 /100    Absolute Neutrophils 9.0 (H) 1.6 - 8.3 10e3/uL    Absolute Lymphocytes 5.2 0.8 - 5.3 10e3/uL    Absolute Monocytes 0.7 0.0 - 1.3 10e3/uL    Absolute Eosinophils 0.2 0.0 - 0.7 10e3/uL    Absolute Basophils 0.1 0.0 - 0.2 10e3/uL    Absolute Immature Granulocytes 0.1 <=0.4 10e3/uL    Absolute NRBCs 0.0 10e3/uL   RBC and Platelet Morphology    Collection Time: 12/08/21  6:03 PM   Result Value Ref Range    Platelet Assessment  Automated Count Confirmed. Platelet morphology is normal.     Automated Count Confirmed. Platelet morphology is normal.    RBC Morphology Confirmed RBC Indices    Asymptomatic COVID-19 Virus (Coronavirus) by PCR Nasopharyngeal    Collection Time: 12/08/21  6:04 PM    Specimen: Nasopharyngeal; Swab   Result Value Ref Range    SARS CoV2 PCR Negative Negative, Testing sent to reference lab. Results will be returned via unsolicited result   Drug abuse screen 1 urine (ED)    Collection Time: 12/08/21  6:04 PM   Result Value Ref Range    Amphetamines Urine Screen Negative Screen Negative    Barbiturates Urine Screen Negative Screen Negative    Benzodiazepines Urine Screen Negative Screen Negative    Cannabinoids Urine  Screen Negative Screen Negative    Cocaine Urine Screen Negative Screen Negative    Opiates Urine Screen Negative Screen Negative   Extra Red Top Tube    Collection Time: 12/08/21  6:05 PM   Result Value Ref Range    Hold Specimen JIC    GGT    Collection Time: 12/10/21  7:55 AM   Result Value Ref Range    GGT 47 0 - 75 U/L   TSH with free T4 reflex and/or T3 as indicated    Collection Time: 12/10/21  7:55 AM   Result Value Ref Range    TSH 1.66 0.40 - 4.00 mU/L   Lipid panel    Collection Time: 12/10/21  7:55 AM   Result Value Ref Range    Cholesterol 210 (H) <200 mg/dL    Triglycerides 228 (H) <150 mg/dL    Direct Measure HDL 64 >=40 mg/dL    LDL Cholesterol Calculated 100 <=100 mg/dL    Non HDL Cholesterol 146 (H) <130 mg/dL          Consults:   Consultation during this admission received from internal medicine on 12/9/21:    Cristobal Jorge is a 41 year old male with a history of alcohol abuse who was admitted to [facility] station 3 a seeking detox from alcohol     #Alcohol abuse, dependence, acute withdrawal.  Management per primary team, psychiatry.  Labs significant for mild elevation in WBC (15.2) and hemoglobin (18.6), could represent hemoconcentration in the setting of poor p.o. intake.  No fevers or systemic symptoms to suggest infection. VSS.  -Agree with MSSA using Valium  -Agree with thiamine, folic acid, MVI  -Would repeat CBC if fever or other signs/symptoms of infection develop     Internal Medicine will sign off at this time. Please notify if any intercurrent medical questions or concerns arise. Thank you for involving me in this patients care.      Vicky French PA-C           Hospital Course:   Patient was admitted to Station [facility] with attending Jess Ramírez MD as a voluntary patient. The patient was placed under status 15 (15 minute checks) to ensure patient safety.     MSSA protocol was initiated due to the patient's history of alcohol abuse and concern for withdrawal symptoms.   Over the course of hospitalization, patient was treated with Valium to manage withdrawal. Last dose of Valium was given on 12/9/21 at 0812. He completed detox on 12/10/21. He required a TOTAL of 70mg of Valium since admission. He has not experienced any significant symptoms of withdrawal since that time. He experienced no complications associated with withdrawal. Hydroxyzine and Trazodone were utilized prn for management of anxiety and sleep, respectively. Patient was treated with multivitamin, thiamine, and folic acid daily. He was evaluated by IM (see above). He was medically cleared at time of discharge. Anti-craving medications were discussed, and patient declined.     Treatment plan includes:   Per CTC note dated 12/9/21:    Met with pt to discuss aftercare plans. Pt reports he is here for detox only and is set up for outpatient treatment at [facility] in [facility]. States he was previously in residential treatment at [facility] and was referred for aftercare by [facility] for [facility]. States that there were some insurance issues that prevented him from starting this aftercare program and pt relapsed during interim. Pt state the insurance has now been approved and he would like to be scheduled for intake after discharge. Pt signed CECI for program and writer placed call to coordinate admission. Spoke with program and was told Stefany will call when she is in the office to schedule. 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 Pt is hopeful to discharge hospital as-soon-as-possible and return home with plan to follow-up with outpatient at [facility]. Pt has [facility] for insurance, AVS initiated. Waiting for return call from Stefany to schedule intake.    Patient denied alcohol cravings at time of discharge. He understands risks associated with relapse. Appears to be motivated to maintain sobriety upon discharge.      He  did participate in groups and was visible in the milieu.     The patient's symptoms of withdrawal fully resolved.     Patient was released to home. At the time of discharge, patient was determined to not be a danger to himself or others. He consistently denied SI/HI, and remained future oriented.     Because this patient meets criteria for an Alcohol Use Disorder, I performed the following brief intervention on the date of this note:              1) Expressed concern that the patient is drinking at unhealthy levels known to increase their risk of alcohol related problems              2) Gave feedback linking alcohol use and health, including personalized feedback explaining how alcohol use can interact with their medical and/or psychiatric problems, and with prescribed medications.              3) Advised patient to abstain.         Discharge Medications:     Current Discharge Medication List      START taking these medications    Details   folic acid (FOLVITE) 1 MG tablet Take 1 tablet (1 mg) by mouth daily  Qty: 30 tablet, Refills: 0    Associated Diagnoses: Alcohol use disorder, moderate, dependence (H)      thiamine (B-1) 100 MG tablet Take 1 tablet (100 mg) by mouth daily  Qty: 30 tablet, Refills: 0    Associated Diagnoses: Alcohol use disorder, moderate, dependence (H)         CONTINUE these medications which have NOT CHANGED    Details   methocarbamol (ROBAXIN) 500 MG tablet Take 500 mg by mouth 4 times daily as needed for muscle spasms      multivitamin w/minerals (THERA-VIT-M) tablet Take 1 tablet by mouth daily                  Psychiatric Examination:   Appearance:  awake, alert and adequately groomed  Attitude:  cooperative  Eye Contact:  good  Mood:  better  Affect:  appropriate and in normal range  Speech:  clear, coherent  Psychomotor Behavior:  no evidence of tardive dyskinesia, dystonia, or tics  Thought Process:  logical, linear and goal oriented  Associations:  no loose associations  Thought  Content:  no evidence of suicidal ideation or homicidal ideation and no evidence of psychotic thought  Insight:  good  Judgment:  intact  Oriented to:  time, person, and place  Attention Span and Concentration:  intact  Recent and Remote Memory:  intact  Language: Able to name objects, Able to repeat phrases and Able to read and write  Fund of Knowledge: appropriate  Muscle Strength and Tone: normal  Gait and Station: Normal         Discharge Plan:   Summary: You were admitted to [facility] on 12/9/21 for detoxification from alcohol.  A medical exam was performed that included lab work. You have met with a  and opted to attend outpatient treatment at [facility] in [facility].  Please take care and make your recovery a daily priority, Cristobal!  It was a pleasure working with you and the entire treatment team here wishes you the very best in your recovery!      Recommendation:  Outpatient Treatment     Main Diagnoses:  Per Dr. Edward Oshea MD;  303.90 (F10.20) Alcohol Use Disorder Severe     Major Treatments, Procedures and Findings:  You were treated for alcohol detoxification using [facility] protocol.  You previously had a chemical dependency assessment. You had labs drawn and those results were reviewed with you. Please take a copy of your lab work with you to your next primary care provider appointment.     Symptoms to Report:  If you experience more anxiety, confusion, sleeplessness, deep sadness or thoughts of suicide, notify your treatment team or notify your primary care provider. IF ANY OF THE SYMPTOMS YOU ARE EXPERIENCING ARE A MEDICAL EMERGENCY CALL 911 IMMEDIATELY.      Lifestyle Adjustment: Adjust your lifestyle to get enough sleep, relaxation, exercise and good nutrition. Continue to develop healthy coping skills to decrease stress and promote a sober living environment. Do not use mood altering substances including alcohol, illegal drugs or addictive medications other than what is currently  prescribed.      Disposition: Home     Facts about COVID19 at www.cdc.gov/COVID19 and www.[place].gov/covid19     Keeping hands clean is one of the most important steps we can take to avoid getting sick and spreading germs to others.  Please wash your hands frequently and lather with soap for at least 20 seconds!     Medical Follow-Up: Patient will make follow-up appointment with primary care provider.           Treatment Follow-Up:  [facility]  [address] [place] [address] [address]  [place] [address]  [phone]  *Call the above number to schedule your intake date     Recovery apps for your phone to locate current in person and zoom recovery meetings  [place] - meeting jeff  AA  - meeting jeff  Meeting guide - meeting jeff  Quick NA meeting - meeting jeff  Sukumar- has various apps     Resources:  *due to covid-19 most AA/NA meetings are being held online however some are in-person or a hybrid combination please check online to verify*  AA meetings online search for them at: https://aa-intergroup.org (worldwide meeting listings)  AA meetings for [place] area can be found online at: https://a[place].org (click local online meetings listings)  NA meetings for [place] area can be found online at: https://www.nam[place].org  (click find a meeting)  AA and NA Sponsors are excellent resources for support and you can find one at any support group meeting.   Alcoholics Anonymous (https://aa.org/): for information 24 hours/day  AA Intergroup service office in [place] (http://www.aastpaul.org/) [phone]  AA Intergroup service office in [facility]: [phone]. (http://www.a[place].org/)  Narcotics Anonymous (www.nam[place].org) (619) 247-2599  https://aafairviewriverside.org/meetings  SMART Recovery - self management for addiction recovery:  www.smartrecovery.org  Pathways ~ A Health Crisis Resource & Support Center:   374.761.3674.  https://prescribetoprevent.org/patient-education/videos/  http://www.harmreduction.org  [facility] 316-236-2719  Support Group:  AA/NA and Sponsor/support.  National [place] on Mental Illness (www.[place].aaron.org): 398.444.1772 or 705-860-1954.  Alcoholics Anonymous (www.alcoholics-anonymous.org): Check your phone book for your local chapter.  Suicide Awareness Voices of Education (SAVE) (www.save.org): 767-530-HSMM (9365)  National Suicide Prevention Line (www.mentalhealthmn.org): 413-590-BXAQ (0504)  Mental Health Consumer/Survivor Network of [place] (www.mhcsn.net): 723.761.6020 or 618-588-9466  Mental Health Association of [place] (www.mentalhealth.org): 358.445.7504 or 673-110-7716   Substance Abuse and Mental Health Services (www.samhsa.gov)  [place] Opioid Prevention Coalition: www.opioidcoalition.org     [place] Recovery [facility] ([facility])  [facility] connects people seeking recovery to resources that help foster and sustain long-term recovery.  Whether you are seeking resources for treatment, transportation, housing, job training, education, health care or other pathways to recovery, [facility] is a great place to start.  222.427.6597.  www.[place]     Great Pod casts for nutrition and wellness  Listen on Apple Podcasts  Dishing Up Nutrition   [employer]   Nutrition       Understand the connection between what you eat and how you feel. Hosted by licensed nutritionists and dietitians from [employer] we share practical, real-life solutions for healthier living through nutrition.      General Medication Instructions:   See your medication sheet(s) for instructions.   Take all medications as prescribed.  Make no changes unless your primary care provider suggests them.   Go to all your primary care provider visits.  Be sure to have all your required lab tests. This way, your medicines can be refilled on time.  Do not use any forms of  alcohol.     Please Note:  If you have any questions at anytime after you are discharged please call [facility] detox unit 3[facility] at [phone].  [facility], Behavioral Intake [phone]  Medical Records call [phone]  Outpatient Behavioral Intake call [phone]  [facility]+ Wait List/Bed Availability call [phone]    Please remember to take all of your behavioral discharge planning and lab paperwork to any follow up appointments, it contains your lab results, diagnosis, medication list and discharge recommendations.       THANK YOU FOR CHOOSING [facility]    Attestation:  The patient has been seen and evaluated by me,  Jess Ramírez MD     > 40 minutes total time that was spent and over 50% of this time was spent in counseling and coordination of care.

## 2021-12-10 NOTE — DISCHARGE INSTRUCTIONS
Behavioral Discharge Planning and Instructions  THANK YOU FOR CHOOSING [address]  [phone]    Summary: You were admitted to [facility] on 12/9/21 for detoxification from alcohol.  A medical exam was performed that included lab work. You have met with a  and opted to attend outpatient treatment at [facility] in [facility].  Please take care and make your recovery a daily priority, Cristobal!  It was a pleasure working with you and the entire treatment team here wishes you the very best in your recovery!     Recommendation:  Outpatient Treatment    Main Diagnoses:  Per Dr. Edward Oshea MD;  303.90 (F10.20) Alcohol Use Disorder Severe    Major Treatments, Procedures and Findings:  You were treated for alcohol detoxification using [facility] protocol.  You previously had a chemical dependency assessment. You had labs drawn and those results were reviewed with you. Please take a copy of your lab work with you to your next primary care provider appointment.    Symptoms to Report:  If you experience more anxiety, confusion, sleeplessness, deep sadness or thoughts of suicide, notify your treatment team or notify your primary care provider. IF ANY OF THE SYMPTOMS YOU ARE EXPERIENCING ARE A MEDICAL EMERGENCY CALL 911 IMMEDIATELY.     Lifestyle Adjustment: Adjust your lifestyle to get enough sleep, relaxation, exercise and good nutrition. Continue to develop healthy coping skills to decrease stress and promote a sober living environment. Do not use mood altering substances including alcohol, illegal drugs or addictive medications other than what is currently prescribed.     Disposition: Home    Facts about COVID19 at www.cdc.gov/COVID19 and www.[place].gov/covid19    Keeping hands clean is one of the most important steps we can take to avoid getting sick and spreading germs to others.  Please wash your hands frequently and lather with soap for at least 20 seconds!    Medical Follow-Up: Patient will make follow-up  appointment with primary care provider.        Treatment Follow-Up:  [facility]  [address] [place] [address] [address]  [place] [address]  [phone]  *Call the above number to schedule your intake date    Recovery apps for your phone to locate current in person and zoom recovery meetings  [place] - meeting jeff  AA  - meeting jeff  Meeting guide - meeting jeff  Quick NA meeting - meeting jeff  Federicoshelbyoleg- has various apps    Resources:  *due to covid-19 most AA/NA meetings are being held online however some are in-person or a hybrid combination please check online to verify*  AA meetings online search for them at: https://aa-intergroup.org (worldwide meeting listings)  AA meetings for [place] area can be found online at: https://a[place].org (click local online meetings listings)  NA meetings for [place] area can be found online at: https://www.nam[place].org  (click find a meeting)  AA and NA Sponsors are excellent resources for support and you can find one at any support group meeting.   Alcoholics Anonymous (https://aa.org/): for information 24 hours/day  AA Intergroup service office in [place] (http://www.aastpaul.org/) [phone]  AA Intergroup service office in [facility]: [phone]. (http://www.aaminneaHexagram 49is.org/)  Narcotics Anonymous (www.nam[place].org) (870) 879-9402  https://aafairviewriverside.org/meetings  SMART Recovery - self management for addiction recovery:  www.smartrecovery.org  Pathways ~ A Health Crisis Resource & Support Center:  132.979.8193.  https://prescribetoprevent.org/patient-education/videos/  http://www.harmreduction.org  [facility] [phone]  Support Group:  AA/NA and Sponsor/support.  National [place] on Mental Illness (www.[place].aaron.org): 706.510.6988 or 243-909-4374.  Alcoholics Anonymous (www.alcoholics-anonymous.org): Check your phone book for your local chapter.  Suicide Awareness Voices of Education (SAVE) (www.save.org):  888-511-SAVE (7283)  National Suicide Prevention Line (www.mentalhealthmn.org): 188-406-RAIY (4366)  Mental Health Consumer/Survivor Network of [place] (www.mhcsn.net): 277.207.2386 or 385-333-2295  Mental Health Association of [place] (www.mentalhealth.org): 231.635.5919 or 614-896-5008   Substance Abuse and Mental Health Services (www.samhsa.gov)  [place] Opioid Prevention Coalition: www.opioidcoalition.org    [place] Recovery Connection (MRC)  [facility] connects people seeking recovery to resources that help foster and sustain long-term recovery.  Whether you are seeking resources for treatment, transportation, housing, job training, education, health care or other pathways to recovery, [facility] is a great place to start.  231.466.9747.  www.[employer] Pod casts for nutrition and wellness  Listen on Apple Podcasts  Dishing Up Nutrition   [employer]   Nutrition       Understand the connection between what you eat and how you feel. Hosted by licensed nutritionists and dietitians from [employer] we share practical, real-life solutions for healthier living through nutrition.     General Medication Instructions:   See your medication sheet(s) for instructions.   Take all medications as prescribed.  Make no changes unless your primary care provider suggests them.   Go to all your primary care provider visits.  Be sure to have all your required lab tests. This way, your medicines can be refilled on time.  Do not use any forms of alcohol.    Please Note:  If you have any questions at anytime after you are discharged please [facility] at [phone].  [facility], Behavioral Intake [phone]  Medical Records call [phone]  Outpatient Behavioral Intake call [phone]  LP+ Wait List/Bed Availability call [phone]    Please remember to take all of your behavioral discharge planning and lab paperwork to any follow up appointments,  it contains your lab results, diagnosis, medication list and discharge recommendations.      THANK YOU FOR CHOOSING  [place]

## 2021-12-10 NOTE — DISCHARGE SUMMARY
Patient discharged at 9:55 AM, Patient will make follow up appointment with primary care provider.  Patient discharged with medication. Belongings given back to patient.  Patient picked up by wife about 10:20 AM. Patient seen off the unit by staff.   Resource material given to patient.

## 2022-03-14 ENCOUNTER — HOSPITAL ENCOUNTER (EMERGENCY)
Facility: CLINIC | Age: 42
Discharge: HOME | End: 2022-03-15
Attending: EMERGENCY MEDICINE | Admitting: EMERGENCY MEDICINE
Payer: COMMERCIAL

## 2022-03-14 DIAGNOSIS — F10.930 ALCOHOL WITHDRAWAL SYNDROME WITHOUT COMPLICATION (H): ICD-10-CM

## 2022-03-14 LAB
ALBUMIN SERPL-MCNC: 4.7 G/DL (ref 3.4–5)
ALCOHOL BREATH TEST: 0.21 (ref 0–0.01)
ALP SERPL-CCNC: 66 U/L (ref 40–150)
ALT SERPL W P-5'-P-CCNC: 74 U/L (ref 0–70)
ANION GAP SERPL CALCULATED.3IONS-SCNC: 21 MMOL/L (ref 3–14)
AST SERPL W P-5'-P-CCNC: 71 U/L (ref 0–45)
BASOPHILS # BLD AUTO: 0.1 10E3/UL (ref 0–0.2)
BASOPHILS NFR BLD AUTO: 1 %
BILIRUB SERPL-MCNC: 1.4 MG/DL (ref 0.2–1.3)
BUN SERPL-MCNC: 22 MG/DL (ref 7–30)
CALCIUM SERPL-MCNC: 9.6 MG/DL (ref 8.5–10.1)
CHLORIDE BLD-SCNC: 99 MMOL/L (ref 94–109)
CO2 SERPL-SCNC: 16 MMOL/L (ref 20–32)
CREAT SERPL-MCNC: 0.88 MG/DL (ref 0.66–1.25)
EOSINOPHIL # BLD AUTO: 0.1 10E3/UL (ref 0–0.7)
EOSINOPHIL NFR BLD AUTO: 1 %
ERYTHROCYTE [DISTWIDTH] IN BLOOD BY AUTOMATED COUNT: 11.4 % (ref 10–15)
GFR SERPL CREATININE-BSD FRML MDRD: >90 ML/MIN/1.73M2
GLUCOSE BLD-MCNC: 94 MG/DL (ref 70–99)
HCT VFR BLD AUTO: 47.7 % (ref 40–53)
HGB BLD-MCNC: 17.5 G/DL (ref 13.3–17.7)
IMM GRANULOCYTES # BLD: 0 10E3/UL
IMM GRANULOCYTES NFR BLD: 0 %
LIPASE SERPL-CCNC: 146 U/L (ref 73–393)
LYMPHOCYTES # BLD AUTO: 3.5 10E3/UL (ref 0.8–5.3)
LYMPHOCYTES NFR BLD AUTO: 29 %
MCH RBC QN AUTO: 30.8 PG (ref 26.5–33)
MCHC RBC AUTO-ENTMCNC: 36.7 G/DL (ref 31.5–36.5)
MCV RBC AUTO: 84 FL (ref 78–100)
MONOCYTES # BLD AUTO: 0.6 10E3/UL (ref 0–1.3)
MONOCYTES NFR BLD AUTO: 5 %
NEUTROPHILS # BLD AUTO: 7.7 10E3/UL (ref 1.6–8.3)
NEUTROPHILS NFR BLD AUTO: 64 %
NRBC # BLD AUTO: 0 10E3/UL
NRBC BLD AUTO-RTO: 0 /100
PLATELET # BLD AUTO: 201 10E3/UL (ref 150–450)
POTASSIUM BLD-SCNC: 3.8 MMOL/L (ref 3.4–5.3)
PROT SERPL-MCNC: 8.2 G/DL (ref 6.8–8.8)
RBC # BLD AUTO: 5.69 10E6/UL (ref 4.4–5.9)
SARS-COV-2 RNA RESP QL NAA+PROBE: NEGATIVE
SODIUM SERPL-SCNC: 136 MMOL/L (ref 133–144)
WBC # BLD AUTO: 12 10E3/UL (ref 4–11)

## 2022-03-14 PROCEDURE — 99284 EMERGENCY DEPT VISIT MOD MDM: CPT | Mod: 25 | Performed by: EMERGENCY MEDICINE

## 2022-03-14 PROCEDURE — 83690 ASSAY OF LIPASE: CPT | Performed by: EMERGENCY MEDICINE

## 2022-03-14 PROCEDURE — 80053 COMPREHEN METABOLIC PANEL: CPT | Performed by: EMERGENCY MEDICINE

## 2022-03-14 PROCEDURE — 250N000009 HC RX 250: Performed by: EMERGENCY MEDICINE

## 2022-03-14 PROCEDURE — 85004 AUTOMATED DIFF WBC COUNT: CPT | Performed by: EMERGENCY MEDICINE

## 2022-03-14 PROCEDURE — 82075 ASSAY OF BREATH ETHANOL: CPT | Performed by: EMERGENCY MEDICINE

## 2022-03-14 PROCEDURE — 258N000003 HC RX IP 258 OP 636: Performed by: EMERGENCY MEDICINE

## 2022-03-14 PROCEDURE — C9803 HOPD COVID-19 SPEC COLLECT: HCPCS | Performed by: EMERGENCY MEDICINE

## 2022-03-14 PROCEDURE — 250N000011 HC RX IP 250 OP 636: Performed by: EMERGENCY MEDICINE

## 2022-03-14 PROCEDURE — 96361 HYDRATE IV INFUSION ADD-ON: CPT | Performed by: EMERGENCY MEDICINE

## 2022-03-14 PROCEDURE — 36415 COLL VENOUS BLD VENIPUNCTURE: CPT | Performed by: EMERGENCY MEDICINE

## 2022-03-14 PROCEDURE — 99284 EMERGENCY DEPT VISIT MOD MDM: CPT | Performed by: EMERGENCY MEDICINE

## 2022-03-14 PROCEDURE — U0005 INFEC AGEN DETEC AMPLI PROBE: HCPCS | Performed by: EMERGENCY MEDICINE

## 2022-03-14 PROCEDURE — 250N000013 HC RX MED GY IP 250 OP 250 PS 637: Performed by: EMERGENCY MEDICINE

## 2022-03-14 PROCEDURE — 96365 THER/PROPH/DIAG IV INF INIT: CPT | Performed by: EMERGENCY MEDICINE

## 2022-03-14 PROCEDURE — 93005 ELECTROCARDIOGRAM TRACING: CPT | Performed by: EMERGENCY MEDICINE

## 2022-03-14 RX ORDER — DIAZEPAM 5 MG
5-20 TABLET ORAL EVERY 30 MIN PRN
Status: DISCONTINUED | OUTPATIENT
Start: 2022-03-14 | End: 2022-03-15 | Stop reason: HOSPADM

## 2022-03-14 RX ORDER — NICOTINE 21 MG/24HR
1 PATCH, TRANSDERMAL 24 HOURS TRANSDERMAL ONCE
Status: DISCONTINUED | OUTPATIENT
Start: 2022-03-14 | End: 2022-03-15 | Stop reason: HOSPADM

## 2022-03-14 RX ORDER — PHENOL 1.4 %
10 AEROSOL, SPRAY (ML) MUCOUS MEMBRANE
COMMUNITY
End: 2024-07-22

## 2022-03-14 RX ORDER — ATENOLOL 50 MG/1
50 TABLET ORAL DAILY PRN
Status: DISCONTINUED | OUTPATIENT
Start: 2022-03-14 | End: 2022-03-15 | Stop reason: HOSPADM

## 2022-03-14 RX ORDER — SODIUM CHLORIDE 9 MG/ML
INJECTION, SOLUTION INTRAVENOUS CONTINUOUS
Status: DISCONTINUED | OUTPATIENT
Start: 2022-03-14 | End: 2022-03-15 | Stop reason: HOSPADM

## 2022-03-14 RX ORDER — ONDANSETRON 4 MG/1
4 TABLET, ORALLY DISINTEGRATING ORAL ONCE
Status: COMPLETED | OUTPATIENT
Start: 2022-03-14 | End: 2022-03-14

## 2022-03-14 RX ORDER — PANTOPRAZOLE SODIUM 40 MG/1
40 TABLET, DELAYED RELEASE ORAL ONCE
Status: COMPLETED | OUTPATIENT
Start: 2022-03-14 | End: 2022-03-14

## 2022-03-14 RX ORDER — DIAZEPAM 10 MG
10 TABLET ORAL ONCE
Status: COMPLETED | OUTPATIENT
Start: 2022-03-14 | End: 2022-03-14

## 2022-03-14 RX ADMIN — DIAZEPAM 10 MG: 10 TABLET ORAL at 21:34

## 2022-03-14 RX ADMIN — DIAZEPAM 10 MG: 5 TABLET ORAL at 23:10

## 2022-03-14 RX ADMIN — ONDANSETRON 4 MG: 4 TABLET, ORALLY DISINTEGRATING ORAL at 21:34

## 2022-03-14 RX ADMIN — SODIUM CHLORIDE 1000 ML: 9 INJECTION, SOLUTION INTRAVENOUS at 22:28

## 2022-03-14 RX ADMIN — PANTOPRAZOLE SODIUM 40 MG: 40 TABLET, DELAYED RELEASE ORAL at 21:48

## 2022-03-14 RX ADMIN — NICOTINE POLACRILEX 2 MG: 2 GUM, CHEWING BUCCAL at 22:52

## 2022-03-14 RX ADMIN — MAGNESIUM SULFATE HEPTAHYDRATE: 500 INJECTION, SOLUTION INTRAMUSCULAR; INTRAVENOUS at 23:11

## 2022-03-14 RX ADMIN — NICOTINE 1 PATCH: 21 PATCH, EXTENDED RELEASE TRANSDERMAL at 22:51

## 2022-03-15 VITALS
WEIGHT: 170 LBS | OXYGEN SATURATION: 99 % | SYSTOLIC BLOOD PRESSURE: 158 MMHG | BODY MASS INDEX: 23.06 KG/M2 | TEMPERATURE: 98.4 F | DIASTOLIC BLOOD PRESSURE: 82 MMHG | HEART RATE: 101 BPM | RESPIRATION RATE: 16 BRPM

## 2022-03-15 PROCEDURE — 250N000013 HC RX MED GY IP 250 OP 250 PS 637: Performed by: EMERGENCY MEDICINE

## 2022-03-15 RX ORDER — ONDANSETRON 4 MG/1
4 TABLET, ORALLY DISINTEGRATING ORAL EVERY 8 HOURS PRN
Qty: 10 TABLET | Refills: 0 | Status: SHIPPED | OUTPATIENT
Start: 2022-03-15 | End: 2022-03-18

## 2022-03-15 RX ORDER — GABAPENTIN 300 MG/1
CAPSULE ORAL
Qty: 12 CAPSULE | Refills: 0 | Status: SHIPPED | OUTPATIENT
Start: 2022-03-15 | End: 2022-04-12

## 2022-03-15 RX ADMIN — DIAZEPAM 10 MG: 5 TABLET ORAL at 00:35

## 2022-03-15 ASSESSMENT — ENCOUNTER SYMPTOMS
HALLUCINATIONS: 0
ABDOMINAL PAIN: 0
VOMITING: 1
NAUSEA: 1

## 2022-03-15 NOTE — ED PROVIDER NOTES
ED Provider Note  [facility]      History     Chief Complaint   Patient presents with     Nausea & Vomiting     Need medical clearance for [facility] in [place]     Alcohol Intoxication     HPI  Cristobal Jorge is a 41 year old male w/ PMH of alcohol use disorder, who presents to the Emergency Department for alcohol withdrawal.  Patient says he has been drinking daily recently.  Last drink was around midnight.  He has hx of getting nauseated when withdrawing which happened today. He was throwing up and so came here to feel better.  He denies abdominal pain.   He states he is currently working on placement at [facility] in [place] where he has gone before.  He denies abuse of other substances.  He denies withdrawal seizures or DTs. He denies si/hi/hallucinations.  He is  and has a child.     Past Medical History  Past Medical History:   Diagnosis Date     Gastroesophageal reflux disease      Substance abuse (H)      Past Surgical History:   Procedure Laterality Date     CARDIAC SURGERY      muscle ablation     MOLE REMOVAL      left arm     Melatonin 10 MG TABS tablet  methocarbamol (ROBAXIN) 500 MG tablet  multivitamin w/minerals (THERA-VIT-M) tablet  folic acid (FOLVITE) 1 MG tablet  thiamine (B-1) 100 MG tablet      Allergies   Allergen Reactions     Amoxicillin Rash and Hives     Family History  History reviewed. No pertinent family history.  Social History   Social History     Tobacco Use     Smoking status: Former Smoker     Smokeless tobacco: Current User     Tobacco comment: vapes   Substance Use Topics     Alcohol use: Yes     Comment: Last drink 24 hours ago     Drug use: Never      Past medical history, past surgical history, medications, allergies, family history, and social history were reviewed with the patient. No additional pertinent items.       Review of Systems   Gastrointestinal: Positive for nausea and vomiting. Negative for abdominal pain.    Psychiatric/Behavioral: Negative for hallucinations and suicidal ideas.   All other systems reviewed and are negative.    A complete review of systems was performed with pertinent positives and negatives noted in the HPI, and all other systems negative.    Physical Exam   BP: (!) 129/91  Pulse: 118  Temp: 98.2  F (36.8  C)  Resp: 16  Weight: 77.1 kg (170 lb)  SpO2: 97 %  Physical Exam  Vitals and nursing note reviewed.   HENT:      Head: Normocephalic and atraumatic.      Nose: No congestion or rhinorrhea.   Eyes:      General: No scleral icterus.     Extraocular Movements: Extraocular movements intact.   Cardiovascular:      Rate and Rhythm: Tachycardia present.   Pulmonary:      Effort: Pulmonary effort is normal.   Abdominal:      General: There is no distension.      Palpations: Abdomen is soft. There is no mass.      Tenderness: There is no abdominal tenderness. There is no guarding or rebound.      Hernia: No hernia is present.   Musculoskeletal:         General: Normal range of motion.      Cervical back: Normal range of motion.   Skin:     Coloration: Skin is not jaundiced.   Neurological:      General: No focal deficit present.      Mental Status: He is alert and oriented to person, place, and time.   Psychiatric:         Attention and Perception: Attention and perception normal.         Mood and Affect: Mood is anxious.         Speech: Speech normal.         Behavior: Behavior is slowed.         Thought Content: Thought content normal.         Cognition and Memory: Cognition and memory normal.         Judgment: Judgment normal.         ED Course      Procedures       The medical record was reviewed and interpreted.  Current labs reviewed and interpreted.         Results for orders placed or performed during the hospital encounter of 03/14/22   Comprehensive metabolic panel     Status: Abnormal   Result Value Ref Range    Sodium 136 133 - 144 mmol/L    Potassium 3.8 3.4 - 5.3 mmol/L    Chloride 99 94 -  109 mmol/L    Carbon Dioxide (CO2) 16 (L) 20 - 32 mmol/L    Anion Gap 21 (H) 3 - 14 mmol/L    Urea Nitrogen 22 7 - 30 mg/dL    Creatinine 0.88 0.66 - 1.25 mg/dL    Calcium 9.6 8.5 - 10.1 mg/dL    Glucose 94 70 - 99 mg/dL    Alkaline Phosphatase 66 40 - 150 U/L    AST 71 (H) 0 - 45 U/L    ALT 74 (H) 0 - 70 U/L    Protein Total 8.2 6.8 - 8.8 g/dL    Albumin 4.7 3.4 - 5.0 g/dL    Bilirubin Total 1.4 (H) 0.2 - 1.3 mg/dL    GFR Estimate >90 >60 mL/min/1.73m2   Lipase     Status: Normal   Result Value Ref Range    Lipase 146 73 - 393 U/L   CBC with platelets and differential     Status: Abnormal   Result Value Ref Range    WBC Count 12.0 (H) 4.0 - 11.0 10e3/uL    RBC Count 5.69 4.40 - 5.90 10e6/uL    Hemoglobin 17.5 13.3 - 17.7 g/dL    Hematocrit 47.7 40.0 - 53.0 %    MCV 84 78 - 100 fL    MCH 30.8 26.5 - 33.0 pg    MCHC 36.7 (H) 31.5 - 36.5 g/dL    RDW 11.4 10.0 - 15.0 %    Platelet Count 201 150 - 450 10e3/uL    % Neutrophils 64 %    % Lymphocytes 29 %    % Monocytes 5 %    % Eosinophils 1 %    % Basophils 1 %    % Immature Granulocytes 0 %    NRBCs per 100 WBC 0 <1 /100    Absolute Neutrophils 7.7 1.6 - 8.3 10e3/uL    Absolute Lymphocytes 3.5 0.8 - 5.3 10e3/uL    Absolute Monocytes 0.6 0.0 - 1.3 10e3/uL    Absolute Eosinophils 0.1 0.0 - 0.7 10e3/uL    Absolute Basophils 0.1 0.0 - 0.2 10e3/uL    Absolute Immature Granulocytes 0.0 <=0.4 10e3/uL    Absolute NRBCs 0.0 10e3/uL   Asymptomatic COVID-19 Virus (Coronavirus) by PCR Nasopharyngeal     Status: Normal    Specimen: Nasopharyngeal; Swab   Result Value Ref Range    SARS CoV2 PCR Negative Negative    Narrative    Testing was performed using the caesar  SARS-CoV-2 & Influenza A/B Assay on the caesar  Marysol  System.  This test should be ordered for the detection of SARS-COV-2 in individuals who meet SARS-CoV-2 clinical and/or epidemiological criteria. Test performance is unknown in asymptomatic patients.  This test is for in vitro diagnostic use under the FDA EUA for  laboratories certified under CLIA to perform moderate and/or high complexity testing. This test has not been FDA cleared or approved.  A negative test does not rule out the presence of PCR inhibitors in the specimen or target RNA in concentration below the limit of detection for the assay. The possibility of a false negative should be considered if the patient's recent exposure or clinical presentation suggests COVID-19.  [facility] are certified under the Clinical Laboratory Improvement Amendments of 1988 (CLIA-88) as qualified to perform moderate and/or high complexity laboratory testing.   Alcohol breath test POCT     Status: Abnormal   Result Value Ref Range    Alcohol Breath Test 0.215 (A) 0.00 - 0.01   CBC with platelets differential     Status: Abnormal    Narrative    The following orders were created for panel order CBC with platelets differential.  Procedure                               Abnormality         Status                     ---------                               -----------         ------                     CBC with platelets and d...[[id number]]  Abnormal            Final result                 Please view results for these tests on the individual orders.          Results for orders placed or performed during the hospital encounter of 03/14/22   Comprehensive metabolic panel     Status: Abnormal   Result Value Ref Range    Sodium 136 133 - 144 mmol/L    Potassium 3.8 3.4 - 5.3 mmol/L    Chloride 99 94 - 109 mmol/L    Carbon Dioxide (CO2) 16 (L) 20 - 32 mmol/L    Anion Gap 21 (H) 3 - 14 mmol/L    Urea Nitrogen 22 7 - 30 mg/dL    Creatinine 0.88 0.66 - 1.25 mg/dL    Calcium 9.6 8.5 - 10.1 mg/dL    Glucose 94 70 - 99 mg/dL    Alkaline Phosphatase 66 40 - 150 U/L    AST 71 (H) 0 - 45 U/L    ALT 74 (H) 0 - 70 U/L    Protein Total 8.2 6.8 - 8.8 g/dL    Albumin 4.7 3.4 - 5.0 g/dL    Bilirubin Total 1.4 (H) 0.2 - 1.3 mg/dL    GFR Estimate >90 >60 mL/min/1.73m2   Lipase     Status:  Normal   Result Value Ref Range    Lipase 146 73 - 393 U/L   CBC with platelets and differential     Status: Abnormal   Result Value Ref Range    WBC Count 12.0 (H) 4.0 - 11.0 10e3/uL    RBC Count 5.69 4.40 - 5.90 10e6/uL    Hemoglobin 17.5 13.3 - 17.7 g/dL    Hematocrit 47.7 40.0 - 53.0 %    MCV 84 78 - 100 fL    MCH 30.8 26.5 - 33.0 pg    MCHC 36.7 (H) 31.5 - 36.5 g/dL    RDW 11.4 10.0 - 15.0 %    Platelet Count 201 150 - 450 10e3/uL    % Neutrophils 64 %    % Lymphocytes 29 %    % Monocytes 5 %    % Eosinophils 1 %    % Basophils 1 %    % Immature Granulocytes 0 %    NRBCs per 100 WBC 0 <1 /100    Absolute Neutrophils 7.7 1.6 - 8.3 10e3/uL    Absolute Lymphocytes 3.5 0.8 - 5.3 10e3/uL    Absolute Monocytes 0.6 0.0 - 1.3 10e3/uL    Absolute Eosinophils 0.1 0.0 - 0.7 10e3/uL    Absolute Basophils 0.1 0.0 - 0.2 10e3/uL    Absolute Immature Granulocytes 0.0 <=0.4 10e3/uL    Absolute NRBCs 0.0 10e3/uL   Asymptomatic COVID-19 Virus (Coronavirus) by PCR Nasopharyngeal     Status: Normal    Specimen: Nasopharyngeal; Swab   Result Value Ref Range    SARS CoV2 PCR Negative Negative    Narrative    Testing was performed using the caesar  SARS-CoV-2 & Influenza A/B Assay on the caesar  Marysol  System.  This test should be ordered for the detection of SARS-COV-2 in individuals who meet SARS-CoV-2 clinical and/or epidemiological criteria. Test performance is unknown in asymptomatic patients.  This test is for in vitro diagnostic use under the FDA EUA for laboratories certified under CLIA to perform moderate and/or high complexity testing. This test has not been FDA cleared or approved.  A negative test does not rule out the presence of PCR inhibitors in the specimen or target RNA in concentration below the limit of detection for the assay. The possibility of a false negative should be considered if the patient's recent exposure or clinical presentation suggests COVID-19.  [facility] are certified under the  Clinical Laboratory Improvement Amendments of 1988 (CLIA-88) as qualified to perform moderate and/or high complexity laboratory testing.   Alcohol breath test POCT     Status: Abnormal   Result Value Ref Range    Alcohol Breath Test 0.215 (A) 0.00 - 0.01   CBC with platelets differential     Status: Abnormal    Narrative    The following orders were created for panel order CBC with platelets differential.  Procedure                               Abnormality         Status                     ---------                               -----------         ------                     CBC with platelets and d...[[id number]]  Abnormal            Final result                 Please view results for these tests on the individual orders.     Medications   sodium chloride 0.9 % 1,000 mL with Infuvite Adult 10 mL, thiamine 100 mg, folic acid 1 mg, magnesium sulfate 2 g infusion ( Intravenous New Bag 3/14/22 2311)   0.9% sodium chloride BOLUS (0 mLs Intravenous Stopped 3/14/22 2359)     Followed by   sodium chloride 0.9% infusion (has no administration in time range)   nicotine (NICODERM CQ) 21 MG/24HR 24 hr patch 1 patch (1 patch Transdermal Patch/Med Applied 3/14/22 2251)   nicotine (NICORETTE) gum 2 mg (2 mg Buccal Given 3/14/22 2252)   diazepam (VALIUM) tablet 5-20 mg (10 mg Oral Given 3/14/22 2310)   atenolol (TENORMIN) tablet 50 mg (has no administration in time range)   ondansetron (ZOFRAN-ODT) ODT tab 4 mg (4 mg Oral Given 3/14/22 2134)   diazepam (VALIUM) tablet 10 mg (10 mg Oral Given 3/14/22 2134)   pantoprazole (PROTONIX) EC tablet 40 mg (40 mg Oral Given 3/14/22 2148)        Assessments & Plan (with Medical Decision Making)   The patient has hx of alcohol dependence and presents to the ED due to relapse and drinking daily.  He says his last drink was at midnight and now he is having withdrawal symptoms. He wants to go to a program in [place] but needs to feel better first.  He appears in withdrawals. He was offered  admission and initially said yes.  However, while getting IV fluids he was able to obtain a bed at his preferred [facility] program.  He denies si/hi. He was given a total of valium 15 mo po.  Also zofran and protonix.  Labs were ordered and reviewed.  His bicarb was 16 and Anion gap of 21.  He was given a banana bag and also a liter of NS.  He declines having his labs rechecked and wishes to leave.  He was discharged with his wife and will brought to the [facility].     I have reviewed the nursing notes. I have reviewed the findings, diagnosis, plan and need for follow up with the patient.    New Prescriptions    No medications on file       Final diagnoses:   Alcohol withdrawal syndrome without complication (H)       --  Nelly Betts MD  [facility] EMERGENCY DEPARTMENT  3/14/2022     Nelly Betts MD  03/15/22 0016

## 2022-03-15 NOTE — DISCHARGE INSTRUCTIONS
Admission to detox ordered and declined.     [facility]     Discharge with family.     Rechecking labs declined by patient.

## 2022-03-15 NOTE — ED NOTES
Patient still anxious and requesting to leave. Patient was informed that because he is intoxicated he cannot leave and he should be seen by the MD first for further and care and plan. Pt agreeable to the plan.

## 2022-03-15 NOTE — ED TRIAGE NOTES
Has been binge drinking.  Last drink 24 hours ago.  Nauseated and vomiting for hours.  States working on placement at [facility] in [place] and needs medical clearance to  get in.  No hx. withdrawal seizures.

## 2022-03-17 ENCOUNTER — HOSPITAL ENCOUNTER (EMERGENCY)
Facility: CLINIC | Age: 42
Discharge: HOME | End: 2022-03-17
Attending: EMERGENCY MEDICINE | Admitting: EMERGENCY MEDICINE
Payer: COMMERCIAL

## 2022-03-17 VITALS
DIASTOLIC BLOOD PRESSURE: 81 MMHG | HEART RATE: 127 BPM | TEMPERATURE: 97.9 F | SYSTOLIC BLOOD PRESSURE: 124 MMHG | OXYGEN SATURATION: 95 % | RESPIRATION RATE: 18 BRPM

## 2022-03-17 DIAGNOSIS — F10.10 ALCOHOL ABUSE: ICD-10-CM

## 2022-03-17 LAB
ALCOHOL BREATH TEST: 0.38 (ref 0–0.01)
AMPHETAMINES UR QL SCN: ABNORMAL
BARBITURATES UR QL: ABNORMAL
BENZODIAZ UR QL: ABNORMAL
CANNABINOIDS UR QL SCN: ABNORMAL
COCAINE UR QL: ABNORMAL
OPIATES UR QL SCN: ABNORMAL

## 2022-03-17 PROCEDURE — 82075 ASSAY OF BREATH ETHANOL: CPT | Performed by: EMERGENCY MEDICINE

## 2022-03-17 PROCEDURE — 250N000013 HC RX MED GY IP 250 OP 250 PS 637: Performed by: EMERGENCY MEDICINE

## 2022-03-17 PROCEDURE — 99283 EMERGENCY DEPT VISIT LOW MDM: CPT | Performed by: EMERGENCY MEDICINE

## 2022-03-17 PROCEDURE — 80307 DRUG TEST PRSMV CHEM ANLYZR: CPT | Performed by: EMERGENCY MEDICINE

## 2022-03-17 RX ORDER — NICOTINE 21 MG/24HR
1 PATCH, TRANSDERMAL 24 HOURS TRANSDERMAL ONCE
Status: DISCONTINUED | OUTPATIENT
Start: 2022-03-17 | End: 2022-03-17 | Stop reason: HOSPADM

## 2022-03-17 RX ADMIN — NICOTINE POLACRILEX 2 MG: 2 GUM, CHEWING BUCCAL at 17:32

## 2022-03-17 RX ADMIN — NICOTINE 1 PATCH: 21 PATCH, EXTENDED RELEASE TRANSDERMAL at 17:32

## 2022-03-17 NOTE — ED TRIAGE NOTES
Patient presents today seeking detox from alcohol; drinks 1 pint of vodka per day. Patient was in treatment, remained sober up until a little over 1 week ago. Patient denies street drug use. Patient uses vaping device. Patient currently resides with family.

## 2022-03-17 NOTE — DISCHARGE INSTRUCTIONS
TODAY'S VISIT:  You were seen today for alcohol abuse  -   - If you had any labs or imaging/radiology tests performed today, you should also discuss these tests with your usual provider.     FOLLOW-UP:  Please make an appointment to follow up with:  - Your Primary Care Provider. If you do not have a PCP, please call the Primary Care Center (phone: [phone] for an appointment    - Have your provider review the results from today's visit with you again to make sure no further follow-up or additional testing is needed based on those results.     RETURN TO THE EMERGENCY DEPARTMENT  Return to the Emergency Department at any time for any new or worsening symptoms or any concerns.

## 2022-03-17 NOTE — ED PROVIDER NOTES
"  History     Chief Complaint   Patient presents with     Alcohol Intoxication     Patient presents seeking detox from alcohol; last drink 1 hour prior to arrival. Denies history of seizures.      HPI  Cristobal Jorge is a 41 year old male with a past medical history of substance abuse, GERD who presents to the emergency department seeking detox placement. The patient states that he drinks 1 pint of vodka per day. Last drink just PTA. Patient reports that he was recently in treatment and remained sober up until a little over 1 week ago. Patient denies illicit drug use. He does use a nicotine vaping device. He  currently resides with family (his mother) as he is \"not allowed home.\" No current acute physical or mental health concerns.     I have reviewed the Medications, Allergies, Past Medical and Surgical History, and Social History in the [facility] system.    Past Medical History:   Diagnosis Date     Gastroesophageal reflux disease      Substance abuse (H)      Past Surgical History:   Procedure Laterality Date     CARDIAC SURGERY      muscle ablation     MOLE REMOVAL      left arm     No current facility-administered medications for this encounter.     Current Outpatient Medications   Medication     folic acid (FOLVITE) 1 MG tablet     gabapentin (NEURONTIN) 300 MG capsule     Melatonin 10 MG TABS tablet     methocarbamol (ROBAXIN) 500 MG tablet     multivitamin w/minerals (THERA-VIT-M) tablet     ondansetron (ZOFRAN ODT) 4 MG ODT tab     thiamine (B-1) 100 MG tablet     Allergies   Allergen Reactions     Amoxicillin Rash and Hives     Past medical history, past surgical history, medications, and allergies were reviewed with the patient. Additional pertinent items: None    Social History     Socioeconomic History     Marital status:      Spouse name: Not on file     Number of children: Not on file     Years of education: Not on file     Highest education level: Not on file   Occupational History     Not on " file   Tobacco Use     Smoking status: Current Every Day Smoker     Types: Vaping Device     Smokeless tobacco: Never Used     Tobacco comment: vapes   Substance and Sexual Activity     Alcohol use: Yes     Comment: pint of vodka daily     Drug use: Never     Sexual activity: Yes     Partners: Female   Other Topics Concern     Not on file   Social History Narrative     Not on file     Social Determinants of Health     Financial Resource Strain: Not on file   Food Insecurity: Not on file   Transportation Needs: Not on file   Physical Activity: Not on file   Stress: Not on file   Social Connections: Not on file   Intimate Partner Violence: Not on file   Housing Stability: Not on file     Social history was reviewed with the patient. Additional pertinent items: None    Review of Systems  General: No fevers or chills  Skin: No rash or diaphoresis  Eyes: No eye redness or discharge  Ears/Nose/Throat: No rhinorrhea or nasal congestion  Respiratory: No cough or SOB  Cardiovascular: No chest pain or palpitations  Gastrointestinal: No nausea, vomiting, or diarrhea  Genitourinary: No urinary frequency, hematuria, or dysuria  Musculoskeletal: No arthralgias or myalgias  Neurologic: No numbness or weakness  Psychiatric: See HPI  Hematologic/Lymphatic/Immunologic: No leg swelling, no easy bruising/bleeding  Endocrine: No polyuria/polydypsia    A complete review of systems was performed with pertinent positives and negatives noted in the HPI, and all other systems negative.    Physical Exam   BP: 124/81  Pulse: (!) 127  Temp: 97.9  F (36.6  C)  Resp: 18  SpO2: 95 %      General: Well nourished, well developed, NAD  HEENT: EOMI, anicteric. NCAT  Neck: no jugular venous distension, supple, nl ROM  Cardiac: tachycardic rate, extremities well perfused  Pulm: airway patent. NLB  Skin: Warm and dry to the touch.  No rash  Extremities: No LE edema, no cyanosis, w/w/p  Neuro: A&Ox3, no gross focal deficits    ED Course         Procedures                          Labs Ordered and Resulted from Time of ED Arrival to Time of ED Departure   DRUG ABUSE SCREEN 1 URINE (ED) - Abnormal       Result Value    Amphetamines Urine Screen Negative      Barbiturates Urine Screen Negative      Benzodiazepines Urine Screen Positive (*)     Cannabinoids Urine Screen Negative      Cocaine Urine Screen Negative      Opiates Urine Screen Negative     ALCOHOL BREATH TEST POCT - Abnormal    Alcohol Breath Test 0.382 (*)             Results for orders placed or performed during the hospital encounter of 03/17/22 (from the past 24 hour(s))   Alcohol breath test POCT   Result Value Ref Range    Alcohol Breath Test 0.382 (A) 0.00 - 0.01   Urine Drugs of Abuse Screen    Narrative    The following orders were created for panel order Urine Drugs of Abuse Screen.  Procedure                               Abnormality         Status                     ---------                               -----------         ------                     Drug abuse screen 1 urin...[[id number]]  Abnormal            Final result                 Please view results for these tests on the individual orders.   Drug abuse screen 1 urine (ED)   Result Value Ref Range    Amphetamines Urine Screen Negative Screen Negative    Barbiturates Urine Screen Negative Screen Negative    Benzodiazepines Urine Screen Positive (A) Screen Negative    Cannabinoids Urine Screen Negative Screen Negative    Cocaine Urine Screen Negative Screen Negative    Opiates Urine Screen Negative Screen Negative       Labs, vital signs, and imaging studies were reviewed by me.    Medications - No data to display    Assessments & Plan (with Medical Decision Making)   Cristobal Jorge is a 41 year old male who presents to the emergency department seeking detox placement.    ETOH level 0.382    UDS positive for benzodiazepines    Patient was discussed with detox intake, they do not have a bed available for the patient.   Medical clearance labs were not ordered. The patient is not interested in placement at another detox facility.    I have reviewed the nursing notes.    I have reviewed the findings, diagnosis, plan and need for follow up with the patient.    The patient's sister is here to pick him up/provide a sober ride back to his mother's house.     Patient to be discharged home. Advised to follow up with PCP. He is also seeking placement at a treatment facility in [place] where he has been treated before. He will consider returning her tomorrow to see if a detox bed is available here at that time. To return to ER immediately with any new/worsening symptoms, particularly any severe symptoms of alcohol withdrawal. . Plan of care discussed with patient who expresses understanding and agrees with plan of care.      New Prescriptions    No medications on file       Final diagnoses:   Alcohol abuse     Deana Stafford MD  3/17/2022   [facility] EMERGENCY DEPARTMENT     Deana Stafford MD  03/17/22 1925

## 2022-04-12 ENCOUNTER — HOSPITAL ENCOUNTER (EMERGENCY)
Facility: CLINIC | Age: 42
Discharge: HOME | End: 2022-04-12
Attending: EMERGENCY MEDICINE | Admitting: EMERGENCY MEDICINE
Payer: COMMERCIAL

## 2022-04-12 ENCOUNTER — TELEPHONE (OUTPATIENT)
Dept: BEHAVIORAL HEALTH | Facility: CLINIC | Age: 42
End: 2022-04-12
Payer: COMMERCIAL

## 2022-04-12 VITALS
DIASTOLIC BLOOD PRESSURE: 84 MMHG | BODY MASS INDEX: 22.51 KG/M2 | OXYGEN SATURATION: 95 % | RESPIRATION RATE: 16 BRPM | HEART RATE: 103 BPM | TEMPERATURE: 98 F | SYSTOLIC BLOOD PRESSURE: 132 MMHG | WEIGHT: 166 LBS

## 2022-04-12 DIAGNOSIS — F10.220 ALCOHOL DEPENDENCE WITH UNCOMPLICATED INTOXICATION (H): ICD-10-CM

## 2022-04-12 LAB
ALBUMIN SERPL-MCNC: 4.3 G/DL (ref 3.4–5)
ALBUMIN SERPL-MCNC: 4.6 G/DL (ref 3.4–5)
ALBUMIN UR-MCNC: 100 MG/DL
ALCOHOL BREATH TEST: 0.29 (ref 0–0.01)
ALCOHOL BREATH TEST: 0.38 (ref 0–0.01)
ALP SERPL-CCNC: 67 U/L (ref 40–150)
ALP SERPL-CCNC: 72 U/L (ref 40–150)
ALT SERPL W P-5'-P-CCNC: 83 U/L (ref 0–70)
ALT SERPL W P-5'-P-CCNC: 87 U/L (ref 0–70)
AMPHETAMINES UR QL SCN: ABNORMAL
ANION GAP SERPL CALCULATED.3IONS-SCNC: 17 MMOL/L (ref 3–14)
ANION GAP SERPL CALCULATED.3IONS-SCNC: 19 MMOL/L (ref 3–14)
APPEARANCE UR: CLEAR
AST SERPL W P-5'-P-CCNC: 60 U/L (ref 0–45)
AST SERPL W P-5'-P-CCNC: 62 U/L (ref 0–45)
BARBITURATES UR QL: ABNORMAL
BASOPHILS # BLD AUTO: 0.1 10E3/UL (ref 0–0.2)
BASOPHILS NFR BLD AUTO: 1 %
BENZODIAZ UR QL: ABNORMAL
BILIRUB SERPL-MCNC: 0.7 MG/DL (ref 0.2–1.3)
BILIRUB SERPL-MCNC: 0.9 MG/DL (ref 0.2–1.3)
BILIRUB UR QL STRIP: NEGATIVE
BUN SERPL-MCNC: 15 MG/DL (ref 7–30)
BUN SERPL-MCNC: 16 MG/DL (ref 7–30)
CALCIUM SERPL-MCNC: 8.2 MG/DL (ref 8.5–10.1)
CALCIUM SERPL-MCNC: 9.2 MG/DL (ref 8.5–10.1)
CANNABINOIDS UR QL SCN: ABNORMAL
CHLORIDE BLD-SCNC: 106 MMOL/L (ref 94–109)
CHLORIDE BLD-SCNC: 109 MMOL/L (ref 94–109)
CO2 SERPL-SCNC: 17 MMOL/L (ref 20–32)
CO2 SERPL-SCNC: 19 MMOL/L (ref 20–32)
COCAINE UR QL: ABNORMAL
COLOR UR AUTO: YELLOW
CREAT SERPL-MCNC: 0.87 MG/DL (ref 0.66–1.25)
CREAT SERPL-MCNC: 0.93 MG/DL (ref 0.66–1.25)
EOSINOPHIL # BLD AUTO: 0.2 10E3/UL (ref 0–0.7)
EOSINOPHIL NFR BLD AUTO: 2 %
ERYTHROCYTE [DISTWIDTH] IN BLOOD BY AUTOMATED COUNT: 12.5 % (ref 10–15)
GFR SERPL CREATININE-BSD FRML MDRD: >90 ML/MIN/1.73M2
GFR SERPL CREATININE-BSD FRML MDRD: >90 ML/MIN/1.73M2
GLUCOSE BLD-MCNC: 73 MG/DL (ref 70–99)
GLUCOSE BLD-MCNC: 81 MG/DL (ref 70–99)
GLUCOSE UR STRIP-MCNC: NEGATIVE MG/DL
HCT VFR BLD AUTO: 50 % (ref 40–53)
HGB BLD-MCNC: 17.6 G/DL (ref 13.3–17.7)
HGB UR QL STRIP: NEGATIVE
IMM GRANULOCYTES # BLD: 0.1 10E3/UL
IMM GRANULOCYTES NFR BLD: 0 %
KETONES UR STRIP-MCNC: 80 MG/DL
LEUKOCYTE ESTERASE UR QL STRIP: NEGATIVE
LYMPHOCYTES # BLD AUTO: 3.5 10E3/UL (ref 0.8–5.3)
LYMPHOCYTES NFR BLD AUTO: 30 %
MCH RBC QN AUTO: 31.4 PG (ref 26.5–33)
MCHC RBC AUTO-ENTMCNC: 35.2 G/DL (ref 31.5–36.5)
MCV RBC AUTO: 89 FL (ref 78–100)
MONOCYTES # BLD AUTO: 0.5 10E3/UL (ref 0–1.3)
MONOCYTES NFR BLD AUTO: 5 %
MUCOUS THREADS #/AREA URNS LPF: PRESENT /LPF
NEUTROPHILS # BLD AUTO: 7.2 10E3/UL (ref 1.6–8.3)
NEUTROPHILS NFR BLD AUTO: 62 %
NITRATE UR QL: NEGATIVE
NRBC # BLD AUTO: 0 10E3/UL
NRBC BLD AUTO-RTO: 0 /100
OPIATES UR QL SCN: ABNORMAL
PH UR STRIP: 5.5 [PH] (ref 5–7)
PLATELET # BLD AUTO: 270 10E3/UL (ref 150–450)
POTASSIUM BLD-SCNC: 4 MMOL/L (ref 3.4–5.3)
POTASSIUM BLD-SCNC: 4.2 MMOL/L (ref 3.4–5.3)
PROT SERPL-MCNC: 7.5 G/DL (ref 6.8–8.8)
PROT SERPL-MCNC: 8.1 G/DL (ref 6.8–8.8)
RBC # BLD AUTO: 5.61 10E6/UL (ref 4.4–5.9)
RBC URINE: 2 /HPF
SARS-COV-2 RNA RESP QL NAA+PROBE: NEGATIVE
SODIUM SERPL-SCNC: 143 MMOL/L (ref 133–144)
SODIUM SERPL-SCNC: 144 MMOL/L (ref 133–144)
SP GR UR STRIP: 1.03 (ref 1–1.03)
SQUAMOUS EPITHELIAL: <1 /HPF
UROBILINOGEN UR STRIP-MCNC: NORMAL MG/DL
WBC # BLD AUTO: 11.6 10E3/UL (ref 4–11)
WBC URINE: 2 /HPF

## 2022-04-12 PROCEDURE — 96361 HYDRATE IV INFUSION ADD-ON: CPT | Performed by: EMERGENCY MEDICINE

## 2022-04-12 PROCEDURE — 84155 ASSAY OF PROTEIN SERUM: CPT | Mod: 91 | Performed by: EMERGENCY MEDICINE

## 2022-04-12 PROCEDURE — 258N000003 HC RX IP 258 OP 636: Performed by: EMERGENCY MEDICINE

## 2022-04-12 PROCEDURE — 96360 HYDRATION IV INFUSION INIT: CPT | Performed by: EMERGENCY MEDICINE

## 2022-04-12 PROCEDURE — 87635 SARS-COV-2 COVID-19 AMP PRB: CPT | Performed by: EMERGENCY MEDICINE

## 2022-04-12 PROCEDURE — 85025 COMPLETE CBC W/AUTO DIFF WBC: CPT | Performed by: EMERGENCY MEDICINE

## 2022-04-12 PROCEDURE — 250N000013 HC RX MED GY IP 250 OP 250 PS 637: Performed by: EMERGENCY MEDICINE

## 2022-04-12 PROCEDURE — 82075 ASSAY OF BREATH ETHANOL: CPT | Performed by: EMERGENCY MEDICINE

## 2022-04-12 PROCEDURE — 99284 EMERGENCY DEPT VISIT MOD MDM: CPT | Performed by: EMERGENCY MEDICINE

## 2022-04-12 PROCEDURE — 81001 URINALYSIS AUTO W/SCOPE: CPT | Performed by: EMERGENCY MEDICINE

## 2022-04-12 PROCEDURE — 36415 COLL VENOUS BLD VENIPUNCTURE: CPT | Performed by: EMERGENCY MEDICINE

## 2022-04-12 PROCEDURE — 99283 EMERGENCY DEPT VISIT LOW MDM: CPT | Mod: 25 | Performed by: EMERGENCY MEDICINE

## 2022-04-12 PROCEDURE — C9803 HOPD COVID-19 SPEC COLLECT: HCPCS | Performed by: EMERGENCY MEDICINE

## 2022-04-12 PROCEDURE — 80307 DRUG TEST PRSMV CHEM ANLYZR: CPT | Performed by: EMERGENCY MEDICINE

## 2022-04-12 RX ORDER — NICOTINE 21 MG/24HR
1 PATCH, TRANSDERMAL 24 HOURS TRANSDERMAL ONCE
Status: DISCONTINUED | OUTPATIENT
Start: 2022-04-12 | End: 2022-04-12 | Stop reason: HOSPADM

## 2022-04-12 RX ORDER — DIAZEPAM 5 MG
5-20 TABLET ORAL EVERY 30 MIN PRN
Status: DISCONTINUED | OUTPATIENT
Start: 2022-04-12 | End: 2022-04-12 | Stop reason: HOSPADM

## 2022-04-12 RX ORDER — DIAZEPAM 5 MG
5 TABLET ORAL ONCE
Status: COMPLETED | OUTPATIENT
Start: 2022-04-12 | End: 2022-04-12

## 2022-04-12 RX ORDER — SODIUM CHLORIDE 9 MG/ML
INJECTION, SOLUTION INTRAVENOUS CONTINUOUS
Status: DISCONTINUED | OUTPATIENT
Start: 2022-04-12 | End: 2022-04-12 | Stop reason: HOSPADM

## 2022-04-12 RX ADMIN — SODIUM CHLORIDE 1000 ML: 9 INJECTION, SOLUTION INTRAVENOUS at 17:36

## 2022-04-12 RX ADMIN — DIAZEPAM 5 MG: 5 TABLET ORAL at 14:58

## 2022-04-12 RX ADMIN — NICOTINE 1 PATCH: 21 PATCH, EXTENDED RELEASE TRANSDERMAL at 14:45

## 2022-04-12 RX ADMIN — DIAZEPAM 10 MG: 5 TABLET ORAL at 17:47

## 2022-04-12 NOTE — ED TRIAGE NOTES
Patient presents seeking detox from alcohol; currently drinks 1 pint of vodka per day. Denies history of seizures. Patient has been drinking heavily for 6 months. Patient denies street drugs; but uses tobacco. Patient lives with wife and child. Mom present today, drove patient to ED.

## 2022-04-12 NOTE — ED PROVIDER NOTES
ED Provider Note  [facility]      History     Chief Complaint   Patient presents with     Alcohol Intoxication     Patient presents today seeking detox from alcohol; last drink 1 hour prior arrival. Denies history of seizures.     HPI  Cristobal Jorge is a 41 year old male with history of alcohol use disorder presenting to the ED seeking alcohol detox. Patient states he drinks approximately 1L of vodka per day. He has a history of withdrawals, no seizures. Last drink 1 hour prior to arrival. No other drug use. No SI or HI. No recent illness. No other symptoms noted.     Past Medical History  Past Medical History:   Diagnosis Date     Gastroesophageal reflux disease      Substance abuse (H)      Past Surgical History:   Procedure Laterality Date     CARDIAC SURGERY      muscle ablation     MOLE REMOVAL      left arm     folic acid (FOLVITE) 1 MG tablet  Melatonin 10 MG TABS tablet  methocarbamol (ROBAXIN) 500 MG tablet  multivitamin w/minerals (THERA-VIT-M) tablet      Allergies   Allergen Reactions     Amoxicillin Rash and Hives     Family History  No family history on file.  Social History   Social History     Tobacco Use     Smoking status: Current Every Day Smoker     Types: Vaping Device     Smokeless tobacco: Never Used     Tobacco comment: vapes   Substance Use Topics     Alcohol use: Yes     Comment: pint of vodka daily     Drug use: Never      Past medical history, past surgical history, medications, allergies, family history, and social history were reviewed with the patient. No additional pertinent items.       Review of Systems  A complete review of systems was performed with pertinent positives and negatives noted in the HPI, and all other systems negative.    Physical Exam   BP: (!) 130/90  Pulse: (!) 123  Temp: 98.6  F (37  C)  Resp: 14  Weight: 75.3 kg (166 lb)  SpO2: 94 %  Physical Exam  Constitutional:       General: He is not in acute distress.     Appearance: He is  not diaphoretic.      Comments: Appears intoxicated.   HENT:      Head: Atraumatic.   Eyes:      General: No scleral icterus.     Pupils: Pupils are equal, round, and reactive to light.   Cardiovascular:      Rate and Rhythm: Tachycardia present.      Heart sounds: Normal heart sounds.   Pulmonary:      Effort: No respiratory distress.      Breath sounds: Normal breath sounds.   Abdominal:      General: Bowel sounds are normal.      Palpations: Abdomen is soft.      Tenderness: There is no abdominal tenderness.   Musculoskeletal:         General: No tenderness.   Skin:     General: Skin is warm.      Findings: No rash.         ED Course      Procedures                     No results found for any visits on 04/12/22.  Medications - No data to display     Assessments & Plan (with Medical Decision Making)   This is a 41 year old male who is here for detox from alcohol. Last use was just prior to arrival. No other drug use. No SI or HI. Etoh level was 0.381. Lab work shows an anion gap of 19. There is no significant acidosis. Patient was given IV fluids. Plan is to re-check CMP after fluids. If patient has persistent anion gap, he may need medical admission. There is no indication in history of toxic alcohol or other ingestions. If anion gap has closed, he will be cleared for detox.     I have reviewed the nursing notes. I have reviewed the findings, diagnosis, plan and need for follow up with the patient.    New Prescriptions    No medications on file       Final diagnoses:   None       --  Remi Lua DO  [facility] EMERGENCY DEPARTMENT  4/12/2022     Remi Lua DO  04/12/22 1828

## 2022-04-12 NOTE — TELEPHONE ENCOUNTER
S: Dr Lua gave clinical saying pt presents in er seeking etoh detox.  B: Breath .38. Pt reports he has been drinking 1 pint of vodka per day for past few months. No hx of sz's but hx of DT's, unk when. Hx of detox. Pt denies drug use. Utox pending. No chronic med prob's. Covid test ordered.  A: vol, coop, denies SI, walking indep, NOT MED CLEARED (labs pending)  R: Dr Lua agreed to call intake back once pt is med cleared

## 2022-04-13 NOTE — DISCHARGE INSTRUCTIONS
Follow-up with your primary care provider.  Return to the emergency department as needed for any new or worsening symptoms.  ]

## 2022-04-13 NOTE — ED NOTES
Discharged to the care of mother who is here with him.  All belongings returned to patient upon discharge from ED.

## 2022-04-30 ENCOUNTER — HEALTH MAINTENANCE LETTER (OUTPATIENT)
Age: 42
End: 2022-04-30

## 2022-06-16 ENCOUNTER — LAB REQUISITION (OUTPATIENT)
Dept: LAB | Facility: CLINIC | Age: 42
End: 2022-06-16
Payer: COMMERCIAL

## 2022-06-16 DIAGNOSIS — Z11.59 ENCOUNTER FOR SCREENING FOR OTHER VIRAL DISEASES: ICD-10-CM

## 2022-06-16 PROCEDURE — U0003 INFECTIOUS AGENT DETECTION BY NUCLEIC ACID (DNA OR RNA); SEVERE ACUTE RESPIRATORY SYNDROME CORONAVIRUS 2 (SARS-COV-2) (CORONAVIRUS DISEASE [COVID-19]), AMPLIFIED PROBE TECHNIQUE, MAKING USE OF HIGH THROUGHPUT TECHNOLOGIES AS DESCRIBED BY CMS-2020-01-R: HCPCS | Mod: ORL | Performed by: FAMILY MEDICINE

## 2022-06-17 LAB — SARS-COV-2 RNA RESP QL NAA+PROBE: NEGATIVE

## 2022-10-06 ENCOUNTER — LAB REQUISITION (OUTPATIENT)
Dept: LAB | Facility: CLINIC | Age: 42
End: 2022-10-06

## 2022-10-06 DIAGNOSIS — R10.13 EPIGASTRIC PAIN: ICD-10-CM

## 2022-10-06 LAB
ALBUMIN SERPL BCG-MCNC: 4.8 G/DL (ref 3.5–5.2)
ALP SERPL-CCNC: 57 U/L (ref 40–129)
ALT SERPL W P-5'-P-CCNC: 17 U/L (ref 10–50)
ANION GAP SERPL CALCULATED.3IONS-SCNC: 13 MMOL/L (ref 7–15)
AST SERPL W P-5'-P-CCNC: 20 U/L (ref 10–50)
BILIRUB SERPL-MCNC: 0.7 MG/DL
BUN SERPL-MCNC: 20.1 MG/DL (ref 6–20)
CALCIUM SERPL-MCNC: 9.7 MG/DL (ref 8.6–10)
CHLORIDE SERPL-SCNC: 101 MMOL/L (ref 98–107)
CHOLEST SERPL-MCNC: 183 MG/DL
CREAT SERPL-MCNC: 1.09 MG/DL (ref 0.67–1.17)
DEPRECATED HCO3 PLAS-SCNC: 26 MMOL/L (ref 22–29)
ERYTHROCYTE [DISTWIDTH] IN BLOOD BY AUTOMATED COUNT: 11.6 % (ref 10–15)
GFR SERPL CREATININE-BSD FRML MDRD: 87 ML/MIN/1.73M2
GLUCOSE SERPL-MCNC: 86 MG/DL (ref 70–99)
HCT VFR BLD AUTO: 43.9 % (ref 40–53)
HDLC SERPL-MCNC: 48 MG/DL
HGB BLD-MCNC: 15.6 G/DL (ref 13.3–17.7)
LDLC SERPL CALC-MCNC: 102 MG/DL
MCH RBC QN AUTO: 29.8 PG (ref 26.5–33)
MCHC RBC AUTO-ENTMCNC: 35.5 G/DL (ref 31.5–36.5)
MCV RBC AUTO: 84 FL (ref 78–100)
NONHDLC SERPL-MCNC: 135 MG/DL
PLATELET # BLD AUTO: 211 10E3/UL (ref 150–450)
POTASSIUM SERPL-SCNC: 4.3 MMOL/L (ref 3.4–5.3)
PROT SERPL-MCNC: 6.8 G/DL (ref 6.4–8.3)
RBC # BLD AUTO: 5.24 10E6/UL (ref 4.4–5.9)
SODIUM SERPL-SCNC: 140 MMOL/L (ref 136–145)
TRIGL SERPL-MCNC: 167 MG/DL
WBC # BLD AUTO: 13.3 10E3/UL (ref 4–11)

## 2022-10-06 PROCEDURE — 82040 ASSAY OF SERUM ALBUMIN: CPT | Performed by: FAMILY MEDICINE

## 2022-10-06 PROCEDURE — 80053 COMPREHEN METABOLIC PANEL: CPT | Performed by: FAMILY MEDICINE

## 2022-10-06 PROCEDURE — 80061 LIPID PANEL: CPT | Performed by: FAMILY MEDICINE

## 2022-10-06 PROCEDURE — 85027 COMPLETE CBC AUTOMATED: CPT | Performed by: FAMILY MEDICINE

## 2022-10-14 ENCOUNTER — LAB REQUISITION (OUTPATIENT)
Dept: LAB | Facility: CLINIC | Age: 42
End: 2022-10-14
Payer: COMMERCIAL

## 2022-10-14 DIAGNOSIS — Z11.59 ENCOUNTER FOR SCREENING FOR OTHER VIRAL DISEASES: ICD-10-CM

## 2022-10-14 PROCEDURE — U0003 INFECTIOUS AGENT DETECTION BY NUCLEIC ACID (DNA OR RNA); SEVERE ACUTE RESPIRATORY SYNDROME CORONAVIRUS 2 (SARS-COV-2) (CORONAVIRUS DISEASE [COVID-19]), AMPLIFIED PROBE TECHNIQUE, MAKING USE OF HIGH THROUGHPUT TECHNOLOGIES AS DESCRIBED BY CMS-2020-01-R: HCPCS | Mod: ORL | Performed by: FAMILY MEDICINE

## 2022-10-15 LAB — SARS-COV-2 RNA RESP QL NAA+PROBE: NEGATIVE

## 2022-11-19 ENCOUNTER — HEALTH MAINTENANCE LETTER (OUTPATIENT)
Age: 42
End: 2022-11-19

## 2023-06-01 ENCOUNTER — HEALTH MAINTENANCE LETTER (OUTPATIENT)
Age: 43
End: 2023-06-01

## 2023-07-31 ENCOUNTER — HOSPITAL ENCOUNTER (OUTPATIENT)
Dept: NUCLEAR MEDICINE | Facility: CLINIC | Age: 43
Setting detail: NUCLEAR MEDICINE
Discharge: HOME | End: 2023-07-31
Attending: INTERNAL MEDICINE | Admitting: INTERNAL MEDICINE
Payer: COMMERCIAL

## 2023-07-31 DIAGNOSIS — R11.2 NAUSEA AND VOMITING IN ADULT: ICD-10-CM

## 2023-07-31 DIAGNOSIS — R10.13 EPIGASTRIC PAIN: ICD-10-CM

## 2023-07-31 PROCEDURE — 78264 GASTRIC EMPTYING IMG STUDY: CPT | Mod: 26 | Performed by: RADIOLOGY

## 2023-07-31 PROCEDURE — A9541 TC99M SULFUR COLLOID: HCPCS | Performed by: INTERNAL MEDICINE

## 2023-07-31 PROCEDURE — 78264 GASTRIC EMPTYING IMG STUDY: CPT

## 2023-07-31 PROCEDURE — 343N000001 HC RX 343: Performed by: INTERNAL MEDICINE

## 2023-07-31 RX ADMIN — Medication 2 MILLICURIE: at 08:23

## 2023-09-20 ENCOUNTER — ANCILLARY PROCEDURE (OUTPATIENT)
Dept: CT IMAGING | Facility: CLINIC | Age: 43
End: 2023-09-20
Attending: PHYSICIAN ASSISTANT
Payer: COMMERCIAL

## 2023-09-20 DIAGNOSIS — R11.2 NAUSEA AND VOMITING, UNSPECIFIED VOMITING TYPE: ICD-10-CM

## 2023-09-20 DIAGNOSIS — R10.13 EPIGASTRIC PAIN: ICD-10-CM

## 2023-09-20 PROCEDURE — 74177 CT ABD & PELVIS W/CONTRAST: CPT | Mod: GC | Performed by: RADIOLOGY

## 2023-09-20 RX ORDER — IOPAMIDOL 755 MG/ML
86 INJECTION, SOLUTION INTRAVASCULAR ONCE
Status: COMPLETED | OUTPATIENT
Start: 2023-09-20 | End: 2023-09-20

## 2023-09-20 RX ADMIN — IOPAMIDOL 86 ML: 755 INJECTION, SOLUTION INTRAVASCULAR at 16:19

## 2023-09-20 NOTE — DISCHARGE INSTRUCTIONS

## 2024-01-26 ENCOUNTER — ANCILLARY PROCEDURE (OUTPATIENT)
Dept: MRI IMAGING | Facility: CLINIC | Age: 44
End: 2024-01-26
Payer: COMMERCIAL

## 2024-01-26 ENCOUNTER — ANCILLARY PROCEDURE (OUTPATIENT)
Dept: GENERAL RADIOLOGY | Facility: CLINIC | Age: 44
End: 2024-01-26
Payer: COMMERCIAL

## 2024-01-26 DIAGNOSIS — R11.2 NAUSEA AND VOMITING, UNSPECIFIED VOMITING TYPE: ICD-10-CM

## 2024-01-26 PROCEDURE — 70030 X-RAY EYE FOR FOREIGN BODY: CPT | Performed by: RADIOLOGY

## 2024-01-26 PROCEDURE — A9585 GADOBUTROL INJECTION: HCPCS | Performed by: RADIOLOGY

## 2024-01-26 PROCEDURE — 70553 MRI BRAIN STEM W/O & W/DYE: CPT | Performed by: RADIOLOGY

## 2024-01-26 RX ORDER — GADOBUTROL 604.72 MG/ML
7.5 INJECTION INTRAVENOUS ONCE
Status: COMPLETED | OUTPATIENT
Start: 2024-01-26 | End: 2024-01-26

## 2024-01-26 RX ADMIN — GADOBUTROL 7.5 ML: 604.72 INJECTION INTRAVENOUS at 15:50

## 2024-06-16 ENCOUNTER — HEALTH MAINTENANCE LETTER (OUTPATIENT)
Age: 44
End: 2024-06-16

## 2024-07-21 SDOH — HEALTH STABILITY: PHYSICAL HEALTH: ON AVERAGE, HOW MANY DAYS PER WEEK DO YOU ENGAGE IN MODERATE TO STRENUOUS EXERCISE (LIKE A BRISK WALK)?: 7 DAYS

## 2024-07-21 SDOH — HEALTH STABILITY: PHYSICAL HEALTH: ON AVERAGE, HOW MANY MINUTES DO YOU ENGAGE IN EXERCISE AT THIS LEVEL?: 60 MIN

## 2024-07-21 ASSESSMENT — SOCIAL DETERMINANTS OF HEALTH (SDOH): HOW OFTEN DO YOU GET TOGETHER WITH FRIENDS OR RELATIVES?: ONCE A WEEK

## 2024-07-22 ENCOUNTER — OFFICE VISIT (OUTPATIENT)
Dept: FAMILY MEDICINE | Facility: CLINIC | Age: 44
End: 2024-07-22
Payer: COMMERCIAL

## 2024-07-22 VITALS
RESPIRATION RATE: 16 BRPM | WEIGHT: 189.9 LBS | OXYGEN SATURATION: 96 % | TEMPERATURE: 99.3 F | SYSTOLIC BLOOD PRESSURE: 122 MMHG | HEART RATE: 82 BPM | BODY MASS INDEX: 25.72 KG/M2 | DIASTOLIC BLOOD PRESSURE: 77 MMHG | HEIGHT: 72 IN

## 2024-07-22 DIAGNOSIS — Z11.4 SCREENING FOR HIV (HUMAN IMMUNODEFICIENCY VIRUS): ICD-10-CM

## 2024-07-22 DIAGNOSIS — M54.16 LUMBAR RADICULOPATHY: ICD-10-CM

## 2024-07-22 DIAGNOSIS — Z00.00 ROUTINE GENERAL MEDICAL EXAMINATION AT A HEALTH CARE FACILITY: Primary | ICD-10-CM

## 2024-07-22 DIAGNOSIS — Z13.220 SCREENING FOR LIPID DISORDERS: ICD-10-CM

## 2024-07-22 DIAGNOSIS — Z11.59 NEED FOR HEPATITIS C SCREENING TEST: ICD-10-CM

## 2024-07-22 DIAGNOSIS — K30 FUNCTIONAL DYSPEPSIA: ICD-10-CM

## 2024-07-22 DIAGNOSIS — F10.91 ALCOHOL USE DISORDER IN REMISSION: ICD-10-CM

## 2024-07-22 LAB
ALBUMIN SERPL BCG-MCNC: 4.6 G/DL (ref 3.5–5.2)
ALP SERPL-CCNC: 58 U/L (ref 40–150)
ALT SERPL W P-5'-P-CCNC: 16 U/L (ref 0–70)
ANION GAP SERPL CALCULATED.3IONS-SCNC: 10 MMOL/L (ref 7–15)
AST SERPL W P-5'-P-CCNC: 26 U/L (ref 0–45)
BILIRUB SERPL-MCNC: 0.5 MG/DL
BUN SERPL-MCNC: 17.3 MG/DL (ref 6–20)
CALCIUM SERPL-MCNC: 9.3 MG/DL (ref 8.8–10.4)
CHLORIDE SERPL-SCNC: 103 MMOL/L (ref 98–107)
CHOLEST SERPL-MCNC: 171 MG/DL
CREAT SERPL-MCNC: 1.13 MG/DL (ref 0.67–1.17)
EGFRCR SERPLBLD CKD-EPI 2021: 83 ML/MIN/1.73M2
ERYTHROCYTE [DISTWIDTH] IN BLOOD BY AUTOMATED COUNT: 11.6 % (ref 10–15)
FASTING STATUS PATIENT QL REPORTED: NO
FASTING STATUS PATIENT QL REPORTED: NO
GLUCOSE SERPL-MCNC: 82 MG/DL (ref 70–99)
HCO3 SERPL-SCNC: 28 MMOL/L (ref 22–29)
HCT VFR BLD AUTO: 42.8 % (ref 40–53)
HDLC SERPL-MCNC: 36 MG/DL
HGB BLD-MCNC: 15.2 G/DL (ref 13.3–17.7)
HIV 1+2 AB+HIV1 P24 AG SERPL QL IA: NONREACTIVE
LDLC SERPL CALC-MCNC: 57 MG/DL
MCH RBC QN AUTO: 29.4 PG (ref 26.5–33)
MCHC RBC AUTO-ENTMCNC: 35.5 G/DL (ref 31.5–36.5)
MCV RBC AUTO: 83 FL (ref 78–100)
NONHDLC SERPL-MCNC: 135 MG/DL
PLATELET # BLD AUTO: 191 10E3/UL (ref 150–450)
POTASSIUM SERPL-SCNC: 4.4 MMOL/L (ref 3.4–5.3)
PROT SERPL-MCNC: 7.1 G/DL (ref 6.4–8.3)
RBC # BLD AUTO: 5.17 10E6/UL (ref 4.4–5.9)
SODIUM SERPL-SCNC: 141 MMOL/L (ref 135–145)
TRIGL SERPL-MCNC: 389 MG/DL
WBC # BLD AUTO: 11.1 10E3/UL (ref 4–11)

## 2024-07-22 PROCEDURE — 80053 COMPREHEN METABOLIC PANEL: CPT

## 2024-07-22 PROCEDURE — 99213 OFFICE O/P EST LOW 20 MIN: CPT | Mod: 25

## 2024-07-22 PROCEDURE — 90471 IMMUNIZATION ADMIN: CPT

## 2024-07-22 PROCEDURE — 36415 COLL VENOUS BLD VENIPUNCTURE: CPT

## 2024-07-22 PROCEDURE — 80061 LIPID PANEL: CPT

## 2024-07-22 PROCEDURE — 90677 PCV20 VACCINE IM: CPT

## 2024-07-22 PROCEDURE — 99386 PREV VISIT NEW AGE 40-64: CPT | Mod: 25

## 2024-07-22 PROCEDURE — 86803 HEPATITIS C AB TEST: CPT

## 2024-07-22 PROCEDURE — 87389 HIV-1 AG W/HIV-1&-2 AB AG IA: CPT

## 2024-07-22 PROCEDURE — 85027 COMPLETE CBC AUTOMATED: CPT

## 2024-07-22 RX ORDER — METHOCARBAMOL 500 MG/1
500 TABLET, FILM COATED ORAL 3 TIMES DAILY PRN
Qty: 30 TABLET | Refills: 1 | Status: SHIPPED | OUTPATIENT
Start: 2024-07-22

## 2024-07-22 RX ORDER — NORTRIPTYLINE HCL 10 MG
10 CAPSULE ORAL AT BEDTIME
COMMUNITY
Start: 2024-06-30

## 2024-07-22 ASSESSMENT — PAIN SCALES - GENERAL: PAINLEVEL: NO PAIN (0)

## 2024-07-22 NOTE — NURSING NOTE
Prior to immunization administration, verified patients identity using patient s name and date of birth. Please see Immunization Activity for additional information.     Screening Questionnaire for Adult Immunization    Are you sick today?   No   Do you have allergies to medications, food, a vaccine component or latex?   No   Have you ever had a serious reaction after receiving a vaccination?   No   Do you have a long-term health problem with heart, lung, kidney, or metabolic disease (e.g., diabetes), asthma, a blood disorder, no spleen, complement component deficiency, a cochlear implant, or a spinal fluid leak?  Are you on long-term aspirin therapy?   No   Do you have cancer, leukemia, HIV/AIDS, or any other immune system problem?   No   Do you have a parent, brother, or sister with an immune system problem?   No   In the past 3 months, have you taken medications that affect  your immune system, such as prednisone, other steroids, or anticancer drugs; drugs for the treatment of rheumatoid arthritis, Crohn s disease, or psoriasis; or have you had radiation treatments?   No   Have you had a seizure, or a brain or other nervous system problem?   No   During the past year, have you received a transfusion of blood or blood    products, or been given immune (gamma) globulin or antiviral drug?   No   For women: Are you pregnant or is there a chance you could become       pregnant during the next month?   No   Have you received any vaccinations in the past 4 weeks?   No     Immunization questionnaire answers were all negative.      Patient instructed to remain in clinic for 15 minutes afterwards, and to report any adverse reactions.     Screening performed by Fatemeh Sloan CMA on 7/22/2024 at 3:07 PM.

## 2024-07-22 NOTE — PROGRESS NOTES
Preventive Care Visit  [facility]  SUKUMAR Tello CNP, Nurse Practitioner Primary Care  Jul 22, 2024      Assessment & Plan     Routine general medical examination at a health care facility  Preventative exam completed today. Discussed healthy lifestyle recommendations of getting 150 minutes of exercise weekly and eating a healthy diet. Reviewed and updated health maintenance. Labs and vaccines updated today.   - CBC with platelets  - Comprehensive metabolic panel (BMP + Alb, Alk Phos, ALT, AST, Total. Bili, TP)  - REVIEW OF HEALTH MAINTENANCE PROTOCOL ORDERS  - Pneumococcal 20 Valent Conjugate (Prevnar 20)    Lumbar radiculopathy  Chronic constant low back pain with radiation to right hip. No red flag symptoms. Physical exam unremarkable except for decreased sensation to lateral knee, calf, ankle on the right. Negative straight leg raises. Recent abdominal CT showing disc narrowing and degenerative changes to his spine. He continues doing his PT exercises without significant improvement. Will refill his Robaxin as needed, obtain MRI, and referral to spine.   - MR Lumbar Spine w/o Contrast  - Spine  Referral  - methocarbamol (ROBAXIN) 500 MG tablet  Dispense: 30 tablet; Refill: 1    Alcohol use disorder in remission  Sober for 2 years.    Functional dyspepsia  Following with [facility]. CECI signed today. Reports negative work up so far. Started on nortriptyline which seems to be helping.     Screening for lipid disorders  - Lipid panel reflex to direct LDL Non-fasting    Screening for HIV (human immunodeficiency virus)  - HIV Antigen Antibody Combo    Need for hepatitis C screening test  - Hepatitis C Screen Reflex to HCV RNA Quant and Genotype    Plan to follow up in one year for routine annual physical.       Patient has been advised of split billing requirements and indicates understanding: Yes        BMI  Estimated body mass index is 25.76 kg/m  as calculated from the following:     Height as of this encounter: 1.829 m (6').    Weight as of this encounter: 86.1 kg (189 lb 14.4 oz).   Weight management plan: Discussed healthy diet and exercise guidelines    Counseling  Appropriate preventive services were addressed with this patient via screening, questionnaire, or discussion as appropriate for fall prevention, nutrition, physical activity, Tobacco-use cessation, weight loss and cognition.  Checklist reviewing preventive services available has been given to the patient.  Reviewed patient's diet, addressing concerns and/or questions.   He is at risk for psychosocial distress and has been provided with information to reduce risk.           Oneil Lala is a 43 year old, presenting for the following:  Physical and Recheck Medication (Pt reports that he is here to complete his physical exam and to discuss lumbar compression.)        7/22/2024     2:15 PM   Additional Questions   Roomed by paras   Accompanied by emilio         7/22/2024     2:16 PM   Patient Reported Additional Medications   Patient reports taking the following new medications nortriptyline        Health Care Directive  Patient does not have a Health Care Directive or Living Will: Discussed advance care planning with patient; information given to patient to review.    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  Physically active at work. No routine exercise.    History of low back pain 10 years ago. Completed PT years ago. Always has pain, usually in the mornings. Some days worse than others. Radiates to right hip, some decreased sensation on the right lateral knee, calf, and ankle. Robaxin as needed. When pain worsens he restarts his PT exercise and foam roller.  Had recent CT  showing scattered degenerative changes of the  visualized spine, including mild endplate eburnation and Schmorl's  node formation at the inferior endplates of L4 and L5, with  mild/moderate disc space narrowing at  L3-4.    Following with [facility] for nausea, vomiting, function dyspepsia. Endoscopy and biopsies unremarkable.     History of alcohol use. Worsened when moved here from [place], COVID, lost his job. Went to treatment and has been sober for 2 years.          7/21/2024   General Health   How would you rate your overall physical health? Good   Feel stress (tense, anxious, or unable to sleep) Only a little      (!) STRESS CONCERN      7/21/2024   Nutrition   Three or more servings of calcium each day? Yes   Diet: Regular (no restrictions)   How many servings of fruit and vegetables per day? (!) 2-3   How many sweetened beverages each day? (!) 2            7/21/2024   Exercise   Days per week of moderate/strenous exercise 7 days   Average minutes spent exercising at this level 60 min            7/21/2024   Social Factors   Frequency of gathering with friends or relatives Once a week   Worry food won't last until get money to buy more No   Food not last or not have enough money for food? No   Do you have housing? (Housing is defined as stable permanent housing and does not include staying ouside in a car, in a tent, in an abandoned building, in an overnight shelter, or couch-surfing.) Yes   Are you worried about losing your housing? No   Lack of transportation? No   Unable to get utilities (heat,electricity)? No            7/21/2024   Dental   Dentist two times every year? Yes            7/21/2024   TB Screening   Were you born outside of the [place]? No            Today's PHQ-2 Score:       7/21/2024     9:54 AM   PHQ-2 ( 1999 Pfizer)   Q1: Little interest or pleasure in doing things 0   Q2: Feeling down, depressed or hopeless 0   PHQ-2 Score 0   Q1: Little interest or pleasure in doing things Not at all   Q2: Feeling down, depressed or hopeless Not at all   PHQ-2 Score 0           7/21/2024   Substance Use   Alcohol more than 3/day or more than 7/wk Not Applicable   Do you use any other substances recreationally? (!) CANNABIS  PRODUCTS        Social History     Tobacco Use    Smoking status: Every Day     Types: Vaping Device    Smokeless tobacco: Never    Tobacco comments:     vapes   Substance Use Topics    Alcohol use: Yes     Comment: pint of vodka daily    Drug use: Never           7/21/2024   One time HIV Screening   Previous HIV test? No          7/21/2024   STI Screening   New sexual partner(s) since last STI/HIV test? No      ASCVD Risk   The 10-year ASCVD risk score (Elliot PARKS, et al., 2019) is: 3.6%    Values used to calculate the score:      Age: 43 years      Sex: Male      Is Non- : No      Diabetic: No      Tobacco smoker: Yes      Systolic Blood Pressure: 122 mmHg      Is BP treated: No      HDL Cholesterol: 48 mg/dL      Total Cholesterol: 183 mg/dL        7/21/2024   Contraception/Family Planning   Questions about contraception or family planning No          Reviewed and updated as needed this visit by Provider                    Lab work is in process  Labs reviewed in EPIC  BP Readings from Last 3 Encounters:   07/22/24 122/77   04/12/22 132/84   03/17/22 124/81    Wt Readings from Last 3 Encounters:   07/22/24 86.1 kg (189 lb 14.4 oz)   04/12/22 75.3 kg (166 lb)   03/14/22 77.1 kg (170 lb)                  Review of Systems  CONSTITUTIONAL: NEGATIVE for fever, chills, change in weight  INTEGUMENTARY/SKIN: NEGATIVE for worrisome rashes, moles or lesions  EYES: NEGATIVE for vision changes or irritation  ENT/MOUTH: NEGATIVE for ear, mouth and throat problems  RESP: NEGATIVE for significant cough or SOB  CV: NEGATIVE for chest pain, palpitations or peripheral edema  GI: dyspepsia  : NEGATIVE for frequency, dysuria, or hematuria  MUSCULOSKELETAL:back pain and paresthesias  NEURO: radicular pain  ENDOCRINE: NEGATIVE for temperature intolerance, skin/hair changes  HEME: NEGATIVE for bleeding problems  PSYCHIATRIC: NEGATIVE for changes in mood or affect     Objective    Exam  /77 (BP  Location: Left arm, Patient Position: Sitting, Cuff Size: Adult Regular)   Pulse 82   Temp 99.3  F (37.4  C) (Oral)   Resp 16   Ht 1.829 m (6')   Wt 86.1 kg (189 lb 14.4 oz)   SpO2 96%   BMI 25.76 kg/m     Estimated body mass index is 25.76 kg/m  as calculated from the following:    Height as of this encounter: 1.829 m (6').    Weight as of this encounter: 86.1 kg (189 lb 14.4 oz).    Physical Exam  GENERAL: alert and no distress  EYES: Eyes grossly normal to inspection, PERRL and conjunctivae and sclerae normal  HENT: ear canals and TM's normal, nose and mouth without ulcers or lesions  NECK: no adenopathy, no asymmetry, masses, or scars  RESP: lungs clear to auscultation - no rales, rhonchi or wheezes  CV: regular rate and rhythm, normal S1 S2, no S3 or S4, no murmur, click or rub, no peripheral edema  ABDOMEN: soft, nontender, no hepatosplenomegaly, no masses and bowel sounds normal  MS: no gross musculoskeletal defects noted, no edema  SKIN: no suspicious lesions or rashes  NEURO: Normal strength and tone, mentation intact and speech normal  Comprehensive back pain exam:  No tenderness, Range of motion not limited by pain, Lower extremity strength functional and equal on both sides, Lower extremity reflexes within normal limits bilaterally, Light touch diminished on  right dorsum of foot (or Toes 1-3), indicating possible L5 nerve involvement and Light touch diminished on  right lateral malleolus (or Toes 4 and 5), indicating possible S1 nerve involvement, and Straight leg raise negative bilaterally  PSYCH: mentation appears normal, affect normal/bright        Signed Electronically by: SUKUMAR Tello CNP

## 2024-07-22 NOTE — PATIENT INSTRUCTIONS
Patient Education   Preventive Care Advice   This is general advice given by our system to help you stay healthy. However, your care team may have specific advice just for you. Please talk to your care team about your preventive care needs.  Nutrition  Eat 5 or more servings of fruits and vegetables each day.  Try wheat bread, brown rice and whole grain pasta (instead of white bread, rice, and pasta).  Get enough calcium and vitamin D. Check the label on foods and aim for 100% of the RDA (recommended daily allowance).  Lifestyle  Exercise at least 150 minutes each week  (30 minutes a day, 5 days a week).  Do muscle strengthening activities 2 days a week. These help control your weight and prevent disease.  No smoking.  Wear sunscreen to prevent skin cancer.  Have a dental exam and cleaning every 6 months.  Yearly exams  See your health care team every year to talk about:  Any changes in your health.  Any medicines your care team has prescribed.  Preventive care, family planning, and ways to prevent chronic diseases.  Shots (vaccines)   HPV shots (up to age 26), if you've never had them before.  Hepatitis B shots (up to age 59), if you've never had them before.  COVID-19 shot: Get this shot when it's due.  Flu shot: Get a flu shot every year.  Tetanus shot: Get a tetanus shot every 10 years.  Pneumococcal, hepatitis A, and RSV shots: Ask your care team if you need these based on your risk.  Shingles shot (for age 50 and up)  General health tests  Diabetes screening:  Starting at age 35, Get screened for diabetes at least every 3 years.  If you are younger than age 35, ask your care team if you should be screened for diabetes.  Cholesterol test: At age 39, start having a cholesterol test every 5 years, or more often if advised.  Bone density scan (DEXA): At age 50, ask your care team if you should have this scan for osteoporosis (brittle bones).  Hepatitis C: Get tested at least once in your life.  STIs (sexually  transmitted infections)  Before age 24: Ask your care team if you should be screened for STIs.  After age 24: Get screened for STIs if you're at risk. You are at risk for STIs (including HIV) if:  You are sexually active with more than one person.  You don't use condoms every time.  You or a partner was diagnosed with a sexually transmitted infection.  If you are at risk for HIV, ask about PrEP medicine to prevent HIV.  Get tested for HIV at least once in your life, whether you are at risk for HIV or not.  Cancer screening tests  Cervical cancer screening: If you have a cervix, begin getting regular cervical cancer screening tests starting at age 21.  Breast cancer scan (mammogram): If you've ever had breasts, begin having regular mammograms starting at age 40. This is a scan to check for breast cancer.  Colon cancer screening: It is important to start screening for colon cancer at age 45.  Have a colonoscopy test every 10 years (or more often if you're at risk) Or, ask your provider about stool tests like a FIT test every year or Cologuard test every 3 years.  To learn more about your testing options, visit:   .  For help making a decision, visit:   https://bit.ly/rm34233.  Prostate cancer screening test: If you have a prostate, ask your care team if a prostate cancer screening test (PSA) at age 55 is right for you.  Lung cancer screening: If you are a current or former smoker ages 50 to 80, ask your care team if ongoing lung cancer screenings are right for you.  For informational purposes only. Not to replace the advice of your health care provider. Copyright   2023 [facility]. All rights reserved. Clinically reviewed by the [facility]. [employer] 267555 - REV 01/24.  Substance Use Disorder: Care Instructions  Overview     You can improve your life and health by stopping your use of alcohol or drugs. When you don't drink or use drugs, you may feel and sleep better. You may  get along better with your family, friends, and coworkers. There are medicines and programs that can help with substance use disorder.  How can you care for yourself at home?  Here are some ways to help you stay sober and prevent relapse.  If you have been given medicine to help keep you sober or reduce your cravings, be sure to take it exactly as prescribed.  Talk to your doctor about programs that can help you stop using drugs or drinking alcohol.  Do not keep alcohol or drugs in your home.  Plan ahead. Think about what you'll say if other people ask you to drink or use drugs. Try not to spend time with people who drink or use drugs.  Use the time and money spent on drinking or drugs to do something that's important to you.  Preventing a relapse  Have a plan to deal with relapse. Learn to recognize changes in your thinking that lead you to drink or use drugs. Get help before you start to drink or use drugs again.  Try to stay away from situations, friends, or places that may lead you to drink or use drugs.  If you feel the need to drink alcohol or use drugs again, seek help right away. Call a trusted friend or family member. Some people get support from organizations such as Narcotics Anonymous or [employer] or from treatment facilities.  If you relapse, get help as soon as you can. Some people make a plan with another person that outlines what they want that person to do for them if they relapse. The plan usually includes how to handle the relapse and who to notify in case of relapse.  Don't give up. Remember that a relapse doesn't mean that you have failed. Use the experience to learn the triggers that lead you to drink or use drugs. Then quit again. Recovery is a lifelong process. Many people have several relapses before they are able to quit for good.  Follow-up care is a key part of your treatment and safety. Be sure to make and go to all appointments, and call your doctor if you are having problems. It's  "also a good idea to know your test results and keep a list of the medicines you take.  When should you call for help?   Call 911  anytime you think you may need emergency care. For example, call if you or someone else:    Has overdosed or has withdrawal signs. Be sure to tell the emergency workers that you are or someone else is using or trying to quit using drugs. Overdose or withdrawal signs may include:  Losing consciousness.  Seizure.  Seeing or hearing things that aren't there (hallucinations).     Is thinking or talking about suicide or harming others.   Where to get help 24 hours a day, 7 days a week   If you or someone you know talks about suicide, self-harm, a mental health crisis, a substance use crisis, or any other kind of emotional distress, get help right away. You can:    Call the Suicide and Crisis Lifeline at 988.     Call 1-009-366-TALK (1-931.573.1344).     Text HOME to 799720 to access the Crisis Text Line.   Consider saving these numbers in your phone.  Go to [facility] for more information or to chat online.  Call your doctor now or seek immediate medical care if:    You are having withdrawal symptoms. These may include nausea or vomiting, sweating, shakiness, and anxiety.   Watch closely for changes in your health, and be sure to contact your doctor if:    You have a relapse.     You need more help or support to stop.   Where can you learn more?  Go to https://www.[employer].net/patiented  Enter H573 in the search box to learn more about \"Substance Use Disorder: Care Instructions.\"  Current as of: November 15, 2023               Content Version: 14.0    5291-0367 [employer].   Care instructions adapted under license by your healthcare professional. If you have questions about a medical condition or this instruction, always ask your healthcare professional. [employer] disclaims any warranty or liability for your use of this information.         "

## 2024-07-23 LAB — HCV AB SERPL QL IA: NONREACTIVE

## 2024-08-14 NOTE — PROGRESS NOTES
ASSESSMENT: Cristobal Jorge is a 43 year old male with past medical history significant for alcohol use disorder in remission who presents today for new patient evaluation of chronic right low back pain with intermittent radiation into the right lower extremity with associated numbness and tingling.  My review of an MRI lumbar spine shows disc degeneration at L4-5 and L5-S1.  At L4-5 there is a right paracentral disc extrusion with caudally migrated sequestered disc material compressing the right L5 nerve root.  There is also lower lumbar facet arthropathy.  Patient does not have any radicular pain currently although he does have a chronic sensory deficit right L5 dermatome.  He also has bilateral thoracic pain likely myofascial in nature.    PLAN:  A shared decision making model was used.  The patient's values and choices were respected.  The following represents what was discussed and decided upon by the physician assistant and the patient.      1.  DIAGNOSTIC TESTS:  - I reviewed the MRI lumbar spine.  No additional diagnostic test were ordered.    2.  PHYSICAL THERAPY:  Discussed the importance of core strengthening, ROM, stretching exercises with the patient and how each of these entities is important in decreasing pain.  Explained to the patient that the purpose of physical therapy is to teach the patient a home exercise program.  These exercises need to be performed every day in order to decrease pain and prevent future occurrences of pain.  I entered a referral to physical therapy.  He will do the [facility] program.    3.  MEDICATIONS:    -Methocarbamol is not very helpful.  - I prescribed cyclobenzaprine 10 mg 3 times daily as needed to take instead of methocarbamol.  - Patient takes nortriptyline for GI issues which is helpful.  - Patient took gabapentin in the past but does not recall his response.      4.  INTERVENTIONS: No interventions were ordered today.  If patient had more significant right lower  extremity radicular pain I would recommend an epidural steroid injection targeting the right L5 nerve root.  - If axial low back pain persist I would recommend right L4-5 and L5-S1 facet joint injections versus right L3, L4, L5 medial branch blocks.    5.  PATIENT EDUCATION: Patient is in agreement the above plan.  All questions were answered.    6.  FOLLOW-UP:   Patient will follow-up with me in about 2 months to monitor progress with physical therapy.  If he has questions or concerns in the meantime, he should not hesitate to call.      SUBJECTIVE:  Cristobal Jorge  Is a 43 year old male who presents today in consultation at request of Lora Sexton CNP for new patient evaluation of low back pain with radiation into the right lower extremity associated numbness and tingling.  Patient reports that he first began to have back pain 10 to 11 years ago.  He had an x-ray at that time which revealed some disc degeneration.  He did physical therapy which was somewhat helpful.  He has had off-and-on back pain ever since.  He states that he has good days and bad days with this pain.  He has episodes of more severe pain and spasms.  He has not had any significant flareups recently.  He had a CT of his abdomen and pelvis which was performed for GI complaints.  Degenerative change in the lumbar spine was observed so an MRI lumbar spine was obtained for further evaluation.  He was referred to our clinic for further evaluation and treatment.    Patient complains of right-sided low back pain.  Pain radiates into the right buttock.  He denies pain further down the leg but has numbness that radiates down the lateral thigh into the lateral calf.  With prior episodes he has had pain in the same distribution but not recently.  Denies weakness.  He experiences muscle tension that radiates up to between the shoulder blades.  Pain is worse when he first wakes up in the morning.  Pain is alleviated with massage, stretching, using a  foam roller and yoga ball.  Denies loss of bowel or bladder control.  Denies recent fevers.    Treatment to date:  - Physical therapy 10 years ago, still does home exercises  - Chiropractic treatment for 6 months 2 years ago which helped with his neck and upper back pain but not his lower back  - No spine injections  - No spine surgeries  - Methocarbamol not very helpful  - Nortriptyline for GI issues  - Gabapentin, does not recall his response  - Tylenol  - Advil    Current Outpatient Medications   Medication Sig Dispense Refill    methocarbamol (ROBAXIN) 500 MG tablet Take 1 tablet (500 mg) by mouth 3 times daily as needed for muscle spasms 30 tablet 1    multivitamin w/minerals (THERA-VIT-M) tablet Take 1 tablet by mouth daily      nortriptyline (PAMELOR) 10 MG capsule Take 10 mg by mouth at bedtime       No current facility-administered medications for this visit.       Allergies   Allergen Reactions    Amoxicillin Rash and Hives       Past Medical History:   Diagnosis Date    Gastroesophageal reflux disease     Substance abuse (H)         Patient Active Problem List   Diagnosis    Alcohol use disorder in remission       Past Surgical History:   Procedure Laterality Date    CARDIAC SURGERY      muscle ablation    MOLE REMOVAL      left arm       Family history: None    Social history: Patient is .  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  He denies tobacco, alcohol, recreational drug use.      ROS: Positive for muscle pain, muscle fatigue, sciatica.  Specifically negative for bowel/bladder dysfunction, fevers,chills, appetite changes, unexplained weight loss.   Otherwise 13 systems reviewed are negative.  Please see the patient's intake questionnaire from today for details.      OBJECTIVE:  PHYSICAL EXAMINATION:    CONSTITUTIONAL:  Vital signs as above.  No acute distress.  The patient is well nourished and well groomed.  PSYCHIATRIC:  The patient is awake, alert, oriented to  person, place, time and answering questions appropriately with clear speech.    HEENT: Normocephalic, atraumatic.  Sclera clear.  Neck is supple.  SKIN:  Skin over the face, bilateral lower extremities, and posterior torso is clean, dry, intact without rashes.    GAIT:  Gait is non-antalgic.  The patient is able to heel and toe walk without significant difficulty.    STANDING EXAMINATION: No significant tenderness palpation bilateral lumbar paraspinous muscles or sacroiliac joints.  Lumbar flexion mildly restricted.  Lumbar extension within normal limits.  MUSCLE STRENGTH:  The patient has 5/5 strength for the bilateral hip flexors, knee flexors/extensors, ankle dorsiflexors/plantar flexors, great toe extensors, ankle evertors/invertors.  NEUROLOGICAL: 2+ patellar, and achilles reflexes bilaterally.  Negative Babinski's bilaterally.  No ankle clonus bilaterally.  Diminished sensation right L5 dermatome.  VASCULAR:  2/4 dorsalis pedis pulses bilaterally.  Bilateral lower extremities are warm.  There is no pitting edema of the bilateral lower extremities.  ABDOMINAL:  Soft, non-distended, non-tender throughout all quadrants.  No pulsatile mass palpated in the left lower quadrant.  LYMPH NODES:  No palpable or tender inguinal lymph nodes.  MUSCULOSKELETAL: Straight leg raise is positive on the right, negative on the left.    RESULTS: I reviewed the MRI lumbar spine dated August 16, 2024.  At L4-5 there is a right paracentral disc extrusion with caudally migrated sequestered disc material compressing the right L5 nerve root in the lateral recess.  There is also facet hypertrophy.  At L5-S1 there is a disc bulge and facet hypertrophy with mild bilateral foraminal stenosis.

## 2024-08-16 ENCOUNTER — ANCILLARY PROCEDURE (OUTPATIENT)
Dept: MRI IMAGING | Facility: CLINIC | Age: 44
End: 2024-08-16
Payer: COMMERCIAL

## 2024-08-16 DIAGNOSIS — M54.16 LUMBAR RADICULOPATHY: ICD-10-CM

## 2024-08-16 PROCEDURE — 72148 MRI LUMBAR SPINE W/O DYE: CPT | Mod: GC | Performed by: STUDENT IN AN ORGANIZED HEALTH CARE EDUCATION/TRAINING PROGRAM

## 2024-08-20 ENCOUNTER — OFFICE VISIT (OUTPATIENT)
Dept: PHYSICAL MEDICINE AND REHAB | Facility: CLINIC | Age: 44
End: 2024-08-20
Payer: COMMERCIAL

## 2024-08-20 VITALS
WEIGHT: 192.4 LBS | BODY MASS INDEX: 26.06 KG/M2 | HEART RATE: 80 BPM | SYSTOLIC BLOOD PRESSURE: 114 MMHG | HEIGHT: 72 IN | DIASTOLIC BLOOD PRESSURE: 74 MMHG

## 2024-08-20 DIAGNOSIS — M54.16 LUMBAR RADICULOPATHY: ICD-10-CM

## 2024-08-20 DIAGNOSIS — M79.18 MYOFASCIAL PAIN: ICD-10-CM

## 2024-08-20 DIAGNOSIS — M47.816 LUMBAR FACET ARTHROPATHY: Primary | ICD-10-CM

## 2024-08-20 DIAGNOSIS — M51.26 LUMBAR DISC HERNIATION: ICD-10-CM

## 2024-08-20 PROCEDURE — 99204 OFFICE O/P NEW MOD 45 MIN: CPT | Performed by: PHYSICIAN ASSISTANT

## 2024-08-20 RX ORDER — CYCLOBENZAPRINE HCL 10 MG
10 TABLET ORAL 3 TIMES DAILY PRN
Qty: 30 TABLET | Refills: 1 | Status: SHIPPED | OUTPATIENT
Start: 2024-08-20

## 2024-08-20 ASSESSMENT — PAIN SCALES - GENERAL: PAINLEVEL: MILD PAIN (2)

## 2024-08-20 NOTE — LETTER
8/20/2024      Cristobal Jorge  [address] [place] [address]      Dear Colleague,    Thank you for referring your patient, Cristobal Jorge, to the [facility]. Please see a copy of my visit note below.    ASSESSMENT: Cristobal Jorge is a 43 year old male with past medical history significant for alcohol use disorder in remission who presents today for new patient evaluation of chronic right low back pain with intermittent radiation into the right lower extremity with associated numbness and tingling.  My review of an MRI lumbar spine shows disc degeneration at L4-5 and L5-S1.  At L4-5 there is a right paracentral disc extrusion with caudally migrated sequestered disc material compressing the right L5 nerve root.  There is also lower lumbar facet arthropathy.  Patient does not have any radicular pain currently although he does have a chronic sensory deficit right L5 dermatome.  He also has bilateral thoracic pain likely myofascial in nature.    PLAN:  A shared decision making model was used.  The patient's values and choices were respected.  The following represents what was discussed and decided upon by the physician assistant and the patient.      1.  DIAGNOSTIC TESTS:  - I reviewed the MRI lumbar spine.  No additional diagnostic test were ordered.    2.  PHYSICAL THERAPY:  Discussed the importance of core strengthening, ROM, stretching exercises with the patient and how each of these entities is important in decreasing pain.  Explained to the patient that the purpose of physical therapy is to teach the patient a home exercise program.  These exercises need to be performed every day in order to decrease pain and prevent future occurrences of pain.  I entered a referral to physical therapy.  He will do the [facility] program.    3.  MEDICATIONS:    -Methocarbamol is not very helpful.  - I prescribed cyclobenzaprine 10 mg 3 times daily as needed to take instead of  methocarbamol.  - Patient takes nortriptyline for GI issues which is helpful.  - Patient took gabapentin in the past but does not recall his response.      4.  INTERVENTIONS: No interventions were ordered today.  If patient had more significant right lower extremity radicular pain I would recommend an epidural steroid injection targeting the right L5 nerve root.  - If axial low back pain persist I would recommend right L4-5 and L5-S1 facet joint injections versus right L3, L4, L5 medial branch blocks.    5.  PATIENT EDUCATION: Patient is in agreement the above plan.  All questions were answered.    6.  FOLLOW-UP:   Patient will follow-up with me in about 2 months to monitor progress with physical therapy.  If he has questions or concerns in the meantime, he should not hesitate to call.      SUBJECTIVE:  Cristobal Jorge  Is a 43 year old male who presents today in consultation at request of Lora Sexton CNP for new patient evaluation of low back pain with radiation into the right lower extremity associated numbness and tingling.  Patient reports that he first began to have back pain 10 to 11 years ago.  He had an x-ray at that time which revealed some disc degeneration.  He did physical therapy which was somewhat helpful.  He has had off-and-on back pain ever since.  He states that he has good days and bad days with this pain.  He has episodes of more severe pain and spasms.  He has not had any significant flareups recently.  He had a CT of his abdomen and pelvis which was performed for GI complaints.  Degenerative change in the lumbar spine was observed so an MRI lumbar spine was obtained for further evaluation.  He was referred to our clinic for further evaluation and treatment.    Patient complains of right-sided low back pain.  Pain radiates into the right buttock.  He denies pain further down the leg but has numbness that radiates down the lateral thigh into the lateral calf.  With prior episodes he has had  pain in the same distribution but not recently.  Denies weakness.  He experiences muscle tension that radiates up to between the shoulder blades.  Pain is worse when he first wakes up in the morning.  Pain is alleviated with massage, stretching, using a foam roller and yoga ball.  Denies loss of bowel or bladder control.  Denies recent fevers.    Treatment to date:  - Physical therapy 10 years ago, still does home exercises  - Chiropractic treatment for 6 months 2 years ago which helped with his neck and upper back pain but not his lower back  - No spine injections  - No spine surgeries  - Methocarbamol not very helpful  - Nortriptyline for GI issues  - Gabapentin, does not recall his response  - Tylenol  - Advil    Current Outpatient Medications   Medication Sig Dispense Refill     methocarbamol (ROBAXIN) 500 MG tablet Take 1 tablet (500 mg) by mouth 3 times daily as needed for muscle spasms 30 tablet 1     multivitamin w/minerals (THERA-VIT-M) tablet Take 1 tablet by mouth daily       nortriptyline (PAMELOR) 10 MG capsule Take 10 mg by mouth at bedtime       No current facility-administered medications for this visit.       Allergies   Allergen Reactions     Amoxicillin Rash and Hives       Past Medical History:   Diagnosis Date     Gastroesophageal reflux disease      Substance abuse (H)         Patient Active Problem List   Diagnosis     Alcohol use disorder in remission       Past Surgical History:   Procedure Laterality Date     CARDIAC SURGERY      muscle ablation     MOLE REMOVAL      left arm       Family history: None    Social history: Patient is .  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  He denies tobacco, alcohol, recreational drug use.      ROS: Positive for muscle pain, muscle fatigue, sciatica.  Specifically negative for bowel/bladder dysfunction, fevers,chills, appetite changes, unexplained weight loss.   Otherwise 13 systems reviewed are negative.  Please see the  patient's intake questionnaire from today for details.      OBJECTIVE:  PHYSICAL EXAMINATION:    CONSTITUTIONAL:  Vital signs as above.  No acute distress.  The patient is well nourished and well groomed.  PSYCHIATRIC:  The patient is awake, alert, oriented to person, place, time and answering questions appropriately with clear speech.    HEENT: Normocephalic, atraumatic.  Sclera clear.  Neck is supple.  SKIN:  Skin over the face, bilateral lower extremities, and posterior torso is clean, dry, intact without rashes.    GAIT:  Gait is non-antalgic.  The patient is able to heel and toe walk without significant difficulty.    STANDING EXAMINATION: No significant tenderness palpation bilateral lumbar paraspinous muscles or sacroiliac joints.  Lumbar flexion mildly restricted.  Lumbar extension within normal limits.  MUSCLE STRENGTH:  The patient has 5/5 strength for the bilateral hip flexors, knee flexors/extensors, ankle dorsiflexors/plantar flexors, great toe extensors, ankle evertors/invertors.  NEUROLOGICAL: 2+ patellar, and achilles reflexes bilaterally.  Negative Babinski's bilaterally.  No ankle clonus bilaterally.  Diminished sensation right L5 dermatome.  VASCULAR:  2/4 dorsalis pedis pulses bilaterally.  Bilateral lower extremities are warm.  There is no pitting edema of the bilateral lower extremities.  ABDOMINAL:  Soft, non-distended, non-tender throughout all quadrants.  No pulsatile mass palpated in the left lower quadrant.  LYMPH NODES:  No palpable or tender inguinal lymph nodes.  MUSCULOSKELETAL: Straight leg raise is positive on the right, negative on the left.    RESULTS: I reviewed the MRI lumbar spine dated August 16, 2024.  At L4-5 there is a right paracentral disc extrusion with caudally migrated sequestered disc material compressing the right L5 nerve root in the lateral recess.  There is also facet hypertrophy.  At L5-S1 there is a disc bulge and facet hypertrophy with mild bilateral foraminal  stenosis.      Again, thank you for allowing me to participate in the care of your patient.        Sincerely,        Laura Heredia PA-C

## 2024-08-20 NOTE — PATIENT INSTRUCTIONS
An order for physicaltherapy has been provided today.  Someone will call you to schedule physical therapy or you can call [phone] to schedule physical therapy.  It will be very important for you to do your physical therapy exercises on aregular basis to decrease your pain and prevent future flares of pain.

## 2024-09-20 ENCOUNTER — TRANSFERRED RECORDS (OUTPATIENT)
Dept: HEALTH INFORMATION MANAGEMENT | Facility: CLINIC | Age: 44
End: 2024-09-20

## 2025-04-09 ENCOUNTER — TELEPHONE (OUTPATIENT)
Dept: FAMILY MEDICINE | Facility: CLINIC | Age: 45
End: 2025-04-09
Payer: COMMERCIAL

## 2025-04-09 NOTE — TELEPHONE ENCOUNTER
April 9, 2025    Patient paperwork was received via [employer] and requires a signature.  Patient label was attached to paperwork and placed in provider's inbox to be signed.  A response was sent to the patient acknowledging receipt of the paperwork and advising the patient that processing documents can take 5-7 business days to complete.    Kristal Medrano

## 2025-07-25 ENCOUNTER — OFFICE VISIT (OUTPATIENT)
Dept: FAMILY MEDICINE | Facility: CLINIC | Age: 45
End: 2025-07-25
Payer: COMMERCIAL

## 2025-07-25 VITALS
RESPIRATION RATE: 14 BRPM | HEART RATE: 103 BPM | DIASTOLIC BLOOD PRESSURE: 82 MMHG | TEMPERATURE: 98.6 F | OXYGEN SATURATION: 98 % | WEIGHT: 201.6 LBS | HEIGHT: 72 IN | SYSTOLIC BLOOD PRESSURE: 116 MMHG | BODY MASS INDEX: 27.3 KG/M2

## 2025-07-25 DIAGNOSIS — H61.22 IMPACTED CERUMEN OF LEFT EAR: ICD-10-CM

## 2025-07-25 DIAGNOSIS — M54.16 LUMBAR RADICULOPATHY: ICD-10-CM

## 2025-07-25 DIAGNOSIS — K30 FUNCTIONAL DYSPEPSIA: ICD-10-CM

## 2025-07-25 DIAGNOSIS — Z00.00 ROUTINE GENERAL MEDICAL EXAMINATION AT A HEALTH CARE FACILITY: Primary | ICD-10-CM

## 2025-07-25 DIAGNOSIS — Z13.6 SCREENING FOR CARDIOVASCULAR CONDITION: ICD-10-CM

## 2025-07-25 DIAGNOSIS — M79.18 MYOFASCIAL PAIN: ICD-10-CM

## 2025-07-25 PROBLEM — H52.209 MYOPIC ASTIGMATISM: Status: ACTIVE | Noted: 2017-01-24

## 2025-07-25 PROBLEM — Q13.4: Status: ACTIVE | Noted: 2017-01-24

## 2025-07-25 PROBLEM — H52.10 MYOPIC ASTIGMATISM: Status: ACTIVE | Noted: 2017-01-24

## 2025-07-25 PROBLEM — F41.8 MIXED ANXIETY DEPRESSIVE DISORDER: Status: ACTIVE | Noted: 2022-03-15

## 2025-07-25 PROCEDURE — 3074F SYST BP LT 130 MM HG: CPT

## 2025-07-25 PROCEDURE — 3079F DIAST BP 80-89 MM HG: CPT

## 2025-07-25 PROCEDURE — 69209 REMOVE IMPACTED EAR WAX UNI: CPT

## 2025-07-25 PROCEDURE — 99213 OFFICE O/P EST LOW 20 MIN: CPT | Mod: 25

## 2025-07-25 PROCEDURE — 99396 PREV VISIT EST AGE 40-64: CPT

## 2025-07-25 RX ORDER — CYCLOBENZAPRINE HCL 10 MG
10 TABLET ORAL 3 TIMES DAILY PRN
Qty: 30 TABLET | Refills: 1 | Status: SHIPPED | OUTPATIENT
Start: 2025-07-25

## 2025-07-25 SDOH — HEALTH STABILITY: PHYSICAL HEALTH: ON AVERAGE, HOW MANY DAYS PER WEEK DO YOU ENGAGE IN MODERATE TO STRENUOUS EXERCISE (LIKE A BRISK WALK)?: 5 DAYS

## 2025-07-25 SDOH — HEALTH STABILITY: PHYSICAL HEALTH: ON AVERAGE, HOW MANY MINUTES DO YOU ENGAGE IN EXERCISE AT THIS LEVEL?: 30 MIN

## 2025-07-25 ASSESSMENT — SOCIAL DETERMINANTS OF HEALTH (SDOH): HOW OFTEN DO YOU GET TOGETHER WITH FRIENDS OR RELATIVES?: ONCE A WEEK

## 2025-07-25 NOTE — PATIENT INSTRUCTIONS
Patient Education   Preventive Care Advice   This is general advice given by our system to help you stay healthy. However, your care team may have specific advice just for you. Please talk to your care team about your preventive care needs.  Nutrition  Eat 5 or more servings of fruits and vegetables each day.  Try wheat bread, brown rice and whole grain pasta (instead of white bread, rice, and pasta).  Get enough calcium and vitamin D. Check the label on foods and aim for 100% of the RDA (recommended daily allowance).  Lifestyle  Exercise at least 150 minutes each week  (30 minutes a day, 5 days a week).  Do muscle strengthening activities 2 days a week. These help control your weight and prevent disease.  No smoking.  Wear sunscreen to prevent skin cancer.  Have a dental exam and cleaning every 6 months.  Yearly exams  See your health care team every year to talk about:  Any changes in your health.  Any medicines your care team has prescribed.  Preventive care, family planning, and ways to prevent chronic diseases.  Shots (vaccines)   HPV shots (up to age 26), if you've never had them before.  Hepatitis B shots (up to age 59), if you've never had them before.  COVID-19 shot: Get this shot when it's due.  Flu shot: Get a flu shot every year.  Tetanus shot: Get a tetanus shot every 10 years.  Pneumococcal, hepatitis A, and RSV shots: Ask your care team if you need these based on your risk.  Shingles shot (for age 50 and up)  General health tests  Diabetes screening:  Starting at age 35, Get screened for diabetes at least every 3 years.  If you are younger than age 35, ask your care team if you should be screened for diabetes.  Cholesterol test: At age 39, start having a cholesterol test every 5 years, or more often if advised.  Bone density scan (DEXA): At age 50, ask your care team if you should have this scan for osteoporosis (brittle bones).  Hepatitis C: Get tested at least once in your life.  STIs (sexually  transmitted infections)  Before age 24: Ask your care team if you should be screened for STIs.  After age 24: Get screened for STIs if you're at risk. You are at risk for STIs (including HIV) if:  You are sexually active with more than one person.  You don't use condoms every time.  You or a partner was diagnosed with a sexually transmitted infection.  If you are at risk for HIV, ask about PrEP medicine to prevent HIV.  Get tested for HIV at least once in your life, whether you are at risk for HIV or not.  Cancer screening tests  Cervical cancer screening: If you have a cervix, begin getting regular cervical cancer screening tests starting at age 21.  Breast cancer scan (mammogram): If you've ever had breasts, begin having regular mammograms starting at age 40. This is a scan to check for breast cancer.  Colon cancer screening: It is important to start screening for colon cancer at age 45.  Have a colonoscopy test every 10 years (or more often if you're at risk) Or, ask your provider about stool tests like a FIT test every year or Cologuard test every 3 years.  To learn more about your testing options, visit:   .  For help making a decision, visit:   https://bit.ly/ic37461.  Prostate cancer screening test: If you have a prostate, ask your care team if a prostate cancer screening test (PSA) at age 55 is right for you.  Lung cancer screening: If you are a current or former smoker ages 50 to 80, ask your care team if ongoing lung cancer screenings are right for you.  For informational purposes only. Not to replace the advice of your health care provider. Copyright   2023 [facility]. All rights reserved. Clinically reviewed by the [facility]. [employer] 764757 - REV 01/24.  Substance Use Disorder: Care Instructions  Overview     You can improve your life and health by stopping your use of alcohol or drugs. When you don't drink or use drugs, you may feel and sleep better. You may  get along better with your family, friends, and coworkers. There are medicines and programs that can help with substance use disorder.  How can you care for yourself at home?  Here are some ways to help you stay sober and prevent relapse.  If you have been given medicine to help keep you sober or reduce your cravings, be sure to take it exactly as prescribed.  Talk to your doctor about programs that can help you stop using drugs or drinking alcohol.  Do not keep alcohol or drugs in your home.  Plan ahead. Think about what you'll say if other people ask you to drink or use drugs. Try not to spend time with people who drink or use drugs.  Use the time and money spent on drinking or drugs to do something that's important to you.  Preventing a relapse  Have a plan to deal with relapse. Learn to recognize changes in your thinking that lead you to drink or use drugs. Get help before you start to drink or use drugs again.  Try to stay away from situations, friends, or places that may lead you to drink or use drugs.  If you feel the need to drink alcohol or use drugs again, seek help right away. Call a trusted friend or family member. Some people get support from organizations such as Narcotics Anonymous or [employer] or from treatment facilities.  If you relapse, get help as soon as you can. Some people make a plan with another person that outlines what they want that person to do for them if they relapse. The plan usually includes how to handle the relapse and who to notify in case of relapse.  Don't give up. Remember that a relapse doesn't mean that you have failed. Use the experience to learn the triggers that lead you to drink or use drugs. Then quit again. Recovery is a lifelong process. Many people have several relapses before they are able to quit for good.  Follow-up care is a key part of your treatment and safety. Be sure to make and go to all appointments, and call your doctor if you are having problems. It's  "also a good idea to know your test results and keep a list of the medicines you take.  When should you call for help?   Call 911  anytime you think you may need emergency care. For example, call if you or someone else:    Has overdosed or has withdrawal signs. Be sure to tell the emergency workers that you are or someone else is using or trying to quit using drugs. Overdose or withdrawal signs may include:  Losing consciousness.  Seizure.  Seeing or hearing things that aren't there (hallucinations).     Is thinking or talking about suicide or harming others.   Where to get help 24 hours a day, 7 days a week   If you or someone you know talks about suicide, self-harm, a mental health crisis, a substance use crisis, or any other kind of emotional distress, get help right away. You can:    Call the Suicide and Crisis Lifeline at 988.     Call 8-377-381-TALK (1-819.985.2062).     Text HOME to 199392 to access the Crisis Text Line.   Consider saving these numbers in your phone.  Go to [employer] for more information or to chat online.  Call your doctor now or seek immediate medical care if:    You are having withdrawal symptoms. These may include nausea or vomiting, sweating, shakiness, and anxiety.   Watch closely for changes in your health, and be sure to contact your doctor if:    You have a relapse.     You need more help or support to stop.   Where can you learn more?  Go to https://www.[employer].net/patiented  Enter H573 in the search box to learn more about \"Substance Use Disorder: Care Instructions.\"  Current as of: August 20, 2024  Content Version: 14.5 2024-2025 [employer].   Care instructions adapted under license by your healthcare professional. If you have questions about a medical condition or this instruction, always ask your healthcare professional. [employer] disclaims any warranty or liability for your use of this information.       "

## 2025-07-25 NOTE — PROGRESS NOTES
RN ear assessment completed prior to ear irrigation. Otoscopic exam reveals cerumen present and irrigation advised. Post Ear Irrigation exam completed and cerumen plug removed, tympanic membrane intact, and no bleeding noted.  Pain assessment completed.     Patient tolerated procedure:  yes    Left ear cleaned

## 2025-07-25 NOTE — PROGRESS NOTES
"Preventive Care Visit  [facility]  SUKUMAR Tello CNP, Nurse Practitioner Primary Care  Jul 25, 2025      Assessment & Plan     Routine general medical examination at a health care facility  Preventative exam completed today. Discussed healthy lifestyle recommendations of getting 150 minutes of exercise weekly and eating a healthy diet. Reviewed and updated health maintenance.  Will return for fasting labs.  - REVIEW OF HEALTH MAINTENANCE PROTOCOL ORDERS  - PRIMARY CARE FOLLOW-UP SCHEDULING  - Comprehensive metabolic panel (BMP + Alb, Alk Phos, ALT, AST, Total. Bili, TP)  - Hepatitis B Surface Antibody    Lumbar radiculopathy  Myofascial pain  - cyclobenzaprine (FLEXERIL) 10 MG tablet  Dispense: 30 tablet; Refill: 1  Saw spine specialty last year.  MRI 8/2024 showing degenerative changes in lumbar spine.  Treating conservatively now with as needed muscle relaxers and exercises.  Cyclobenzaprine was more effective than methocarbamol so refill this today for him.  If no improvement recommendation was for steroid injection targeting L5.    Functional dyspepsia  Follows with [facility].  Managed with nortriptyline.    Impacted cerumen of left ear  Left ear occluded with wax which is most likely the cause of his symptoms.  - AL REMOVAL IMPACTED CERUMEN IRRIGATION/LVG UNILAT    Screening for cardiovascular condition  - Lipid panel reflex to direct LDL Fasting    Plan to return for fasting labs.    Patient has been advised of split billing requirements and indicates understanding: Yes    BMI  Estimated body mass index is 27.42 kg/m  as calculated from the following:    Height as of this encounter: 1.826 m (5' 11.9\").    Weight as of this encounter: 91.4 kg (201 lb 9.6 oz).     Counseling  Appropriate preventive services were addressed with this patient via screening, questionnaire, or discussion as appropriate for fall prevention, nutrition, physical activity, Tobacco-use cessation, social engagement, " weight loss and cognition.  Checklist reviewing preventive services available has been given to the patient.  Reviewed patient's diet, addressing concerns and/or questions.   Reviewed preventive health counseling, as reflected in patient instructions       Regular exercise       Healthy diet/nutrition    Follow-up    Follow-up Visit   Expected date:  Jul 25, 2026 (Approximate)      Follow Up Appointment Details:     Follow-up with whom?: PCP    Follow-Up for what?: Adult Preventive    How?: In Person                 Oneil Lala is a 44 year old, presenting for the following:  Annual Visit        7/25/2025    12:46 PM   Additional Questions   Roomed by Remi EDUARDO RN          HPI    History of low back pain.  TENS unit, acupressure pads.  Doing PT exercises.  Saw spine specialty.  Reports that cyclobenzaprine worked better than the Robaxin.    Just got back from a trip with his family.  Went to [facility] with son and wife.  Went to multiple soccer games    Left ear - few weeks of muffled hearing felt like water in his ear. Was tender but that has improved.  Denies pain today.  Denies any drainage.  Has pool in the backyard so has been swimming this summer.    Follows with [facility] - on nortriptyline for functional dyspepsia.  Has gained some weight since eating has improved.      Advance Care Planning  Discussed advance care planning with patient; informed AVS has link to [employer].        7/25/2025   General Health   How would you rate your overall physical health? Good   Feel stress (tense, anxious, or unable to sleep) Not at all         7/25/2025   Nutrition   Three or more servings of calcium each day? Yes   Diet: Regular (no restrictions)   How many servings of fruit and vegetables per day? (!) 2-3   How many sweetened beverages each day? 0-1         7/25/2025   Exercise   Days per week of moderate/strenous exercise 5 days   Average minutes spent exercising at this level 30 min          2025   Social Factors   Frequency of gathering with friends or relatives Once a week   Worry food won't last until get money to buy more No   Food not last or not have enough money for food? No   Do you have housing? (Housing is defined as stable permanent housing and does not include staying outside in a car, in a tent, in an abandoned building, in an overnight shelter, or couch-surfing.) Yes   Are you worried about losing your housing? No   Lack of transportation? No   Unable to get utilities (heat,electricity)? No         2025   Dental   Dentist two times every year? Yes         Today's PHQ-2 Score:       2025     9:17 AM   PHQ-2 (  [employer])   Q1: Little interest or pleasure in doing things 0   Q2: Feeling down, depressed or hopeless 0   PHQ-2 Score 0    Q1: Little interest or pleasure in doing things Not at all   Q2: Feeling down, depressed or hopeless Not at all   PHQ-2 Score 0       Patient-reported           2025   Substance Use   Alcohol more than 3/day or more than 7/wk Not Applicable   Do you use any other substances recreationally? (!) CANNABIS PRODUCTS     Social History     Tobacco Use    Smoking status: Former     Current packs/day: 0.00     Average packs/day: 0.5 packs/day for 14.3 years (7.2 ttl pk-yrs)     Types: Cigarettes     Start date: 2001     Quit date: 2016     Years since quittin.5    Smokeless tobacco: Never    Tobacco comments:     vapes   Vaping Use    Vaping status: Former   Substance Use Topics    Alcohol use: Not Currently     Comment: pint of vodka daily    Drug use: Yes     Types: Marijuana           2025   STI Screening   New sexual partner(s) since last STI/HIV test? No   ASCVD Risk   The 10-year ASCVD risk score (Elliot PARKS, et al., 2019) is: 1.6%    Values used to calculate the score:      Age: 44 years      Sex: Male      Is Non- : No      Diabetic: No      Tobacco smoker: No      Systolic Blood Pressure:  116 mmHg      Is BP treated: No      HDL Cholesterol: 36 mg/dL      Total Cholesterol: 171 mg/dL        2025   Contraception/Family Planning   Questions about contraception or family planning No       Reviewed and updated as needed this visit by Provider                    Lab work is in process  Labs reviewed in EPIC  BP Readings from Last 3 Encounters:   25 116/82   24 114/74   24 122/77    Wt Readings from Last 3 Encounters:   25 91.4 kg (201 lb 9.6 oz)   24 87.3 kg (192 lb 6.4 oz)   24 86.1 kg (189 lb 14.4 oz)              Patient Active Problem List   Diagnosis    Alcohol use disorder in remission     Past Surgical History:   Procedure Laterality Date    CARDIAC SURGERY      muscle ablation    MOLE REMOVAL      left arm       Social History     Tobacco Use    Smoking status: Former     Current packs/day: 0.00     Average packs/day: 0.5 packs/day for 14.3 years (7.2 ttl pk-yrs)     Types: Cigarettes     Start date: 2001     Quit date: 2016     Years since quittin.5    Smokeless tobacco: Never    Tobacco comments:     vapes   Substance Use Topics    Alcohol use: Not Currently     Comment: pint of vodka daily     History reviewed. No pertinent family history.      Current Outpatient Medications   Medication Sig Dispense Refill    cyclobenzaprine (FLEXERIL) 10 MG tablet Take 1 tablet (10 mg) by mouth 3 times daily as needed 30 tablet 1    multivitamin w/minerals (THERA-VIT-M) tablet Take 1 tablet by mouth daily      nortriptyline (PAMELOR) 10 MG capsule Take 10 mg by mouth at bedtime       Allergies   Allergen Reactions    Amoxicillin Rash and Hives         Review of Systems  Constitutional, HEENT, cardiovascular, pulmonary, GI, , musculoskeletal, neuro, skin, endocrine and psych systems are negative, except as otherwise noted.     Objective    Exam  /82 (BP Location: Left arm, Patient Position: Sitting, Cuff Size: Adult Regular)   Pulse 103   Temp 98.6  " F (37  C) (Temporal)   Resp 14   Ht 1.826 m (5' 11.9\")   Wt 91.4 kg (201 lb 9.6 oz)   SpO2 98%   BMI 27.42 kg/m     Estimated body mass index is 27.42 kg/m  as calculated from the following:    Height as of this encounter: 1.826 m (5' 11.9\").    Weight as of this encounter: 91.4 kg (201 lb 9.6 oz).    Physical Exam  GENERAL: alert and no distress  EYES: Eyes grossly normal to inspection, PERRL and conjunctivae and sclerae normal  HENT: normal cephalic/atraumatic, right ear: normal: no effusions, no erythema, normal landmarks, left ear: occluded with wax, nose and mouth without ulcers or lesions, oropharynx clear, and oral mucous membranes moist  NECK: no adenopathy, no asymmetry, masses, or scars  RESP: lungs clear to auscultation - no rales, rhonchi or wheezes  CV: regular rate and rhythm, normal S1 S2, no S3 or S4, no murmur, click or rub, no peripheral edema  ABDOMEN: soft, nontender, no hepatosplenomegaly, no masses and bowel sounds normal  MS: no gross musculoskeletal defects noted, no edema  SKIN: no suspicious lesions or rashes  NEURO: Normal strength and tone, mentation intact and speech normal  PSYCH: mentation appears normal, affect normal/bright        Signed Electronically by: SUKUMAR Tello CNP    "

## 2025-07-28 ENCOUNTER — MYC MEDICAL ADVICE (OUTPATIENT)
Dept: FAMILY MEDICINE | Facility: CLINIC | Age: 45
End: 2025-07-28
Payer: COMMERCIAL

## 2025-07-28 DIAGNOSIS — H61.22 IMPACTED CERUMEN OF LEFT EAR: Primary | ICD-10-CM

## 2025-07-28 DIAGNOSIS — H91.92 CHANGE IN HEARING OF LEFT EAR: ICD-10-CM

## 2025-07-28 NOTE — TELEPHONE ENCOUNTER
Provider please review [employer] message and advise.     Patient last seen on 7/25/25. ENT referral pended to review, edit, and sign if agreeable for this as next step.

## 2025-07-29 ENCOUNTER — PATIENT OUTREACH (OUTPATIENT)
Dept: CARE COORDINATION | Facility: CLINIC | Age: 45
End: 2025-07-29
Payer: COMMERCIAL

## 2025-07-31 ENCOUNTER — PATIENT OUTREACH (OUTPATIENT)
Dept: CARE COORDINATION | Facility: CLINIC | Age: 45
End: 2025-07-31
Payer: COMMERCIAL

## 2025-08-05 ENCOUNTER — LAB (OUTPATIENT)
Dept: LAB | Facility: CLINIC | Age: 45
End: 2025-08-05
Payer: COMMERCIAL

## 2025-08-05 DIAGNOSIS — Z13.6 SCREENING FOR CARDIOVASCULAR CONDITION: ICD-10-CM

## 2025-08-05 DIAGNOSIS — Z00.00 ROUTINE GENERAL MEDICAL EXAMINATION AT A HEALTH CARE FACILITY: ICD-10-CM

## 2025-08-05 LAB
ALBUMIN SERPL BCG-MCNC: 4.7 G/DL (ref 3.5–5.2)
ALP SERPL-CCNC: 69 U/L (ref 40–150)
ALT SERPL W P-5'-P-CCNC: 21 U/L (ref 0–70)
ANION GAP SERPL CALCULATED.3IONS-SCNC: 13 MMOL/L (ref 7–15)
AST SERPL W P-5'-P-CCNC: 23 U/L (ref 0–45)
BILIRUB SERPL-MCNC: 0.6 MG/DL
BUN SERPL-MCNC: 19.3 MG/DL (ref 6–20)
CALCIUM SERPL-MCNC: 9.5 MG/DL (ref 8.8–10.4)
CHLORIDE SERPL-SCNC: 101 MMOL/L (ref 98–107)
CHOLEST SERPL-MCNC: 193 MG/DL
CREAT SERPL-MCNC: 1.22 MG/DL (ref 0.67–1.17)
EGFRCR SERPLBLD CKD-EPI 2021: 75 ML/MIN/1.73M2
FASTING STATUS PATIENT QL REPORTED: YES
FASTING STATUS PATIENT QL REPORTED: YES
GLUCOSE SERPL-MCNC: 113 MG/DL (ref 70–99)
HBV SURFACE AB SERPL IA-ACNC: >1000 M[IU]/ML
HBV SURFACE AB SERPL IA-ACNC: REACTIVE M[IU]/ML
HCO3 SERPL-SCNC: 26 MMOL/L (ref 22–29)
HDLC SERPL-MCNC: 39 MG/DL
LDLC SERPL CALC-MCNC: 90 MG/DL
NONHDLC SERPL-MCNC: 154 MG/DL
POTASSIUM SERPL-SCNC: 4 MMOL/L (ref 3.4–5.3)
PROT SERPL-MCNC: 7.4 G/DL (ref 6.4–8.3)
SODIUM SERPL-SCNC: 140 MMOL/L (ref 135–145)
TRIGL SERPL-MCNC: 318 MG/DL

## 2025-08-05 PROCEDURE — 36415 COLL VENOUS BLD VENIPUNCTURE: CPT

## 2025-08-05 PROCEDURE — 80061 LIPID PANEL: CPT

## 2025-08-05 PROCEDURE — 86706 HEP B SURFACE ANTIBODY: CPT

## 2025-08-05 PROCEDURE — 80053 COMPREHEN METABOLIC PANEL: CPT

## 2025-08-12 ENCOUNTER — LAB (OUTPATIENT)
Dept: LAB | Facility: CLINIC | Age: 45
End: 2025-08-12
Payer: COMMERCIAL

## 2025-08-12 DIAGNOSIS — R73.9 ELEVATED BLOOD SUGAR: ICD-10-CM

## 2025-08-12 LAB
EST. AVERAGE GLUCOSE BLD GHB EST-MCNC: 108 MG/DL
HBA1C MFR BLD: 5.4 % (ref 0–5.6)

## 2025-08-12 PROCEDURE — 36415 COLL VENOUS BLD VENIPUNCTURE: CPT

## 2025-08-12 PROCEDURE — 83036 HEMOGLOBIN GLYCOSYLATED A1C: CPT

## 2025-11-12 ENCOUNTER — PRE VISIT (OUTPATIENT)
Dept: OTOLARYNGOLOGY | Facility: CLINIC | Age: 45
End: 2025-11-12